# Patient Record
Sex: MALE | Race: WHITE | NOT HISPANIC OR LATINO | Employment: UNEMPLOYED | URBAN - METROPOLITAN AREA
[De-identification: names, ages, dates, MRNs, and addresses within clinical notes are randomized per-mention and may not be internally consistent; named-entity substitution may affect disease eponyms.]

---

## 2017-04-27 ENCOUNTER — NON-PROVIDER VISIT (OUTPATIENT)
Dept: URGENT CARE | Facility: CLINIC | Age: 47
End: 2017-04-27

## 2017-04-27 DIAGNOSIS — Z02.1 PRE-EMPLOYMENT DRUG SCREENING: ICD-10-CM

## 2017-04-27 LAB
AMP AMPHETAMINE: NORMAL
COC COCAINE: NORMAL
INT CON NEG: NORMAL
INT CON POS: NORMAL
MET METHAMPHETAMINES: NORMAL
OPI OPIATES: NORMAL
PCP PHENCYCLIDINE: NORMAL
POC DRUG COMMENT 753798-OCCUPATIONAL HEALTH: NORMAL
THC: NORMAL

## 2017-04-27 PROCEDURE — 80305 DRUG TEST PRSMV DIR OPT OBS: CPT | Performed by: PHYSICIAN ASSISTANT

## 2017-05-30 ENCOUNTER — OCCUPATIONAL MEDICINE (OUTPATIENT)
Dept: URGENT CARE | Facility: CLINIC | Age: 47
End: 2017-05-30
Payer: COMMERCIAL

## 2017-05-30 ENCOUNTER — APPOINTMENT (OUTPATIENT)
Dept: RADIOLOGY | Facility: IMAGING CENTER | Age: 47
End: 2017-05-30
Attending: PHYSICIAN ASSISTANT
Payer: COMMERCIAL

## 2017-05-30 VITALS
TEMPERATURE: 97.9 F | SYSTOLIC BLOOD PRESSURE: 184 MMHG | BODY MASS INDEX: 33.32 KG/M2 | HEIGHT: 71 IN | RESPIRATION RATE: 16 BRPM | DIASTOLIC BLOOD PRESSURE: 106 MMHG | HEART RATE: 84 BPM | WEIGHT: 238 LBS | OXYGEN SATURATION: 96 %

## 2017-05-30 DIAGNOSIS — S96.911A ANKLE STRAIN, RIGHT, INITIAL ENCOUNTER: ICD-10-CM

## 2017-05-30 DIAGNOSIS — M25.571 ACUTE RIGHT ANKLE PAIN: ICD-10-CM

## 2017-05-30 PROCEDURE — 73610 X-RAY EXAM OF ANKLE: CPT | Mod: TC,RT | Performed by: PHYSICIAN ASSISTANT

## 2017-05-30 PROCEDURE — 99203 OFFICE O/P NEW LOW 30 MIN: CPT | Performed by: PHYSICIAN ASSISTANT

## 2017-05-30 ASSESSMENT — ENCOUNTER SYMPTOMS
SHORTNESS OF BREATH: 0
FEVER: 0
COUGH: 0
CHILLS: 0
ROS SKIN COMMENTS: NO BRUISING OR REDNESS
PALPITATIONS: 0
SENSORY CHANGE: 0
TINGLING: 0
FOCAL WEAKNESS: 0

## 2017-05-30 ASSESSMENT — PAIN SCALES - GENERAL: PAINLEVEL: 10=SEVERE PAIN

## 2017-05-30 NOTE — Clinical Note
"EMPLOYEE’S CLAIM FOR COMPENSATION/ REPORT OF INITIAL TREATMENT  FORM C-4    EMPLOYEE’S CLAIM - PROVIDE ALL INFORMATION REQUESTED   First Name  Leonel Last Name  Kwesi Birthdate                    1970                Sex  male Claim Number   Home Address  [address] Age  46 y.o. 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 (if applicable)  Getting off move xx rolled twisted ankle.    How did this injury or occupational disease occur? (Be specific an answer in detail. Use additional sheet if necessary)  Picking order at [employer] on move[employer] twisted ankle.   If you believe that you have an occupational disease, when did you first have knowledge of the disability and it relationship to your employment?  n/a Witnesses to the Accident  n/a      Nature of Injury or Occupational Disease  Workers' Compensation  Part(s) of Body Injured or Affected  Ankle (R), ,     I certify that the above is true and correct to the best of my knowledge and that I have provided this information in order to obtain the benefits of [place] " [place]).  I hereby authorize any physician, chiropractor, surgeon, practitioner, or other person, any hospital, including [facility] or [facility] hospital, any medical service organization, any insurance company, or other institution or organization to release to each other, any medical or other information, including benefits paid or payable, pertinent to this injury or disease, except information relative to diagnosis, treatment and/or counseling for AIDS, psychological conditions, alcohol or controlled substances, for which I must give specific authorization.  A Photostat of this authorization shall be as valid as the original.     Date 5/30/17   [facility]   Employee’s Signature   THIS REPORT MUST BE COMPLETED AND MAILED WITHIN 3 WORKING DAYS OF TREATMENT   Place  [facility]  Name of Facility  [facility]   Date  5/30/2017 Diagnosis  (S96.911A) Ankle strain, right, initial encounter Is there evidence the injured employee was under the influence of alcohol and/or another controlled substance at the time of accident?   Hour  9:44 AM Description of Injury or Disease  The encounter diagnosis was Ankle strain, right, initial encounter. No   Treatment  RICE, OTC Ibuprofen 600 mg po TID prn  Have you advised the patient to remain off work five days or more? No   X-Ray Findings  Negative   If Yes   From Date  To Date      From information given by the employee, together with medical evidence, can you directly connect this injury or occupational disease as job incurred?  Yes If No Full Duty  Yes Modified Duty      Is additional medical care by a physician indicated?  Yes If Modified Duty, Specify any Limitations / Restrictions  Patient does not work until 5/2/17. RICE for the next several days. Follow-up on 5/2/17. If no improvement consider restrictions at that time.   Do you know of any  "previous injury or disease contributing to this condition or occupational disease?                            Yes  Comments:Patient reports previous fracture of right ankle   Date  5/30/2017 Print Doctor’s Name Radha Cruz PA-C I certify the employer’s copy of  this form was mailed on:   Address  [address] Insurer’s Use Only     [facility] Zip  [address]    Provider’s Tax ID Number  [id number]  Telephone  Dept: [phone]        e-RADHA Walker PA-C   e-Signature: Dr. Himanshu Pantoja, Medical Director Degree  ELYSIA        ORIGINAL-TREATING PHYSICIAN OR CHIROPRACTOR    PAGE 2-INSURER/TPA    PAGE 3-EMPLOYER    PAGE 4-EMPLOYEE             Form C-4 (rev10/07)              BRIEF DESCRIPTION OF RIGHTS AND BENEFITS  (Pursuant to [place]RS 616C.050)    Notice of Injury or Occupational Disease (Incident Report Form C-1): If an injury or occupational disease (OD) arises out of and in the  course of employment, you must provide written notice to your employer as soon as practicable, but no later than 7 days after the accident or  OD. Your employer shall maintain a sufficient supply of the required forms.    Claim for Compensation (Form C-4): If medical treatment is sought, the form C-4 is available at the place of initial treatment. A completed  \"Claim for Compensation\" (Form C-4) must be filed within 90 days after an accident or OD. The treating physician or chiropractor must,  within 3 working days after treatment, complete and mail to the employer, the employer's insurer and third-party , the Claim for  Compensation.    Medical Treatment: If you require medical treatment for your on-the-job injury or OD, you may be required to select a physician or  chiropractor from a list provided by your workers’ compensation insurer, if it has contracted with an Organization for Managed Care (MCO) or  Preferred Provider Organization (PPO) or providers of health care. If your employer has " not entered into a contract with an MCO or PPO, you  may select a physician or chiropractor from the Panel of Physicians and Chiropractors. Any medical costs related to your industrial injury or  OD will be paid by your insurer.    Temporary Total Disability (TTD): If your doctor has certified that you are unable to work for a period of at least 5 consecutive days, or 5  cumulative days in a 20-day period, or places restrictions on you that your employer does not accommodate, you may be entitled to TTD  compensation.    Temporary Partial Disability (TPD): If the wage you receive upon reemployment is less than the compensation for TTD to which you are  entitled, the insurer may be required to pay you TPD compensation to make up the difference. TPD can only be paid for a maximum of 24  months.    Permanent Partial Disability (PPD): When your medical condition is stable and there is an indication of a PPD as a result of your injury or  OD, within 30 days, your insurer must arrange for an evaluation by a rating physician or chiropractor to determine the degree of your PPD. The  amount of your PPD award depends on the date of injury, the results of the PPD evaluation and your age and wage.    Permanent Total Disability (PTD): If you are medically certified by a treating physician or chiropractor as permanently and totally disabled  and have been granted a PTD status by your insurer, you are entitled to receive monthly benefits not to exceed 66 2/3% of your average  monthly wage. The amount of your PTD payments is subject to reduction if you previously received a PPD award.    Vocational Rehabilitation Services: You may be eligible for vocational rehabilitation services if you are unable to return to the job due to a  permanent physical impairment or permanent restrictions as a result of your injury or occupational disease.    Transportation and Per [place] Reimbursement: You may be eligible for travel expenses and per shasta  associated with medical treatment.    Reopening: You may be able to reopen your claim if your condition worsens after claim closure.    Appeal Process: If you disagree with a written determination issued by the insurer or the insurer does not respond to your request, you may  appeal to the Department of Administration, , by following the instructions contained in your determination letter. You must  appeal the determination within 70 days from the date of the determination letter at [address], [address], [place], [place]  [address], or [address], [address], [place], [place] [address]. If you disagree with the  decision, you may appeal to the  Department of Administration, . You must file your appeal within 30 days from the date of the  decision  letter at [address], [address], [place], [place] [address], or [address], [place], [place] [address]. If you  disagree with a decision of an , you may file a petition for judicial review with the District Court. You must do so within 30  days of the Appeal Officer’s decision. You may be represented by an  at your own expense or you may contact the [facility] for possible  representation.    [place]  for Injured Workers (NAIW): If you disagree with a  decision, you may request that NAIW represent you  without charge at an  Hearing. For information regarding denial of benefits, you may contact the [facility] at: [address], [address], [place] [address], [phone], or [address], [address], [place] [address], [phone]    To File a Complaint with the Division: If you wish to file a complaint with the  of the Division of Industrial Relations (DIR),  please contact the Workers’ Compensation Section, [address], [address], [place], [place] [address], telephone  [phone], or  [address], [address], [place], [place] [address], telephone [phone].    For assistance with Workers’ Compensation Issues: you may contact the Office of the Governor Consumer Health Assistance, [address] [place] [address], [address], [place], [place] [address], Toll Free 1-393.736.2053, Web site: http://[employer] [place].[facility].nv., E-mail  Binta@[facility].nv.                                                                                                                                                                                                                                   __________________________________________________________________                                                                   ________5/30/17_________                Employee Name / Signature                                                                                                                                                       Date                                                                                                                                                                                                     D-2 (rev. 10/07)

## 2017-05-30 NOTE — MR AVS SNAPSHOT
"        Leonel Durantjustina   2017 9:45 AM   Occupational Medicine   MRN: [id number]    Department:  [facility]   Dept Phone:  [phone]    Description:  Male : 1970   Provider:  Milagro Cruz PA-C           Reason for Visit     Ankle Pain rolled right ankle        Allergies as of 2017     Allergen Noted Reactions    Penicillins 2010   Anaphylaxis    Pt tolerates Keflex    Hydrocodone 2016   Itching    Made him itch all over    Zinc 10/08/2015   Itching      You were diagnosed with     Ankle strain, right, initial encounter   [[id number]]         Vital Signs     Blood Pressure Pulse Temperature Respirations Height Weight    184/106 mmHg 84 36.6 °C (97.9 °F) 16 1.803 m (5' 11\") 107.956 kg (238 lb)    Body Mass Index Oxygen Saturation Smoking Status             33.21 kg/m2 96% Former Smoker         Basic Information     Date Of Birth Sex Race Ethnicity Preferred Language    1970 Male White Non- English      Your appointments     2017  3:00 PM   Workers Compensation with [employer]   [facility] [employer] (--)    [address]  [facility] [facility] [address]   [phone]              Problem List              ICD-10-CM Priority Class Noted - Resolved    Leg ulcer, left (CMS-HCC) L97.929   2012 - Present    Wound infection T14.8, L08.9   2012 - Present    Cellulitis of left leg L03.116   2012 - Present    Hypertension I10   2012 - Present    Hyperlipemia E78.5   2012 - Present    DIABETES MELLITUS    2012 - Present    Severe sepsis (CMS-HCC) A41.9, R65.20   10/8/2015 - Present    Cellulitis L03.90   10/8/2015 - Present    Cellulitis L03.90   10/8/2015 - Present    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   10/8/2015 - Present    Acute kidney injury (CMS-HCC) N17.9   10/8/2015 - Present    Leukocytosis D72.829   10/8/2015 - Present    Ulcer of lower extremity due to diabetes (CMS-HCC) " E11.622, L97.909   10/8/2015 - Present    Ulcer of abdomen wall, limited to breakdown of skin (CMS-[facility]) L98.491   10/8/2015 - Present    Hyponatremia E87.1   10/8/2015 - Present    Elevated alkaline phosphatase measurement R74.8   10/8/2015 - Present    Abnormal urinalysis R82.90   10/8/2015 - Present    Inguinal adenopathy R59.0   10/14/2015 - Present      Health Maintenance        Date Due Completion Dates    IMM HEP B VACCINE (1 of 3 - Primary Series) 1970 ---    DIABETES MONOFILAMENT / LE EXAM 2/15/1970 ---    RETINAL SCREENING 8/15/1987 ---    FASTING LIPID PROFILE 8/15/1987 ---    URINE ACR / MICROALBUMIN 8/15/1987 ---    IMM DTaP/Tdap/Td Vaccine (1 - Tdap) 8/15/1988 ---    IMM PNEUMOCOCCAL 19-64 (ADULT) MEDIUM RISK SERIES (1 of 1 - PPSV23) 8/15/1988 ---    A1C SCREENING 4/8/2016 10/8/2015    SERUM CREATININE 10/16/2016 10/16/2015, 10/15/2015, 10/14/2015, 10/13/2015, 10/12/2015, 10/11/2015, 10/10/2015, 10/9/2015, 10/8/2015, 10/8/2015, 11/20/2010            Current Immunizations     No immunizations on file.      Below and/or attached are the medications your provider expects you to take. Review all of your home medications and newly ordered medications with your provider and/or pharmacist. Follow medication instructions as directed by your provider and/or pharmacist. Please keep your medication list with you and share with your provider. Update the information when medications are discontinued, doses are changed, or new medications (including over-the-counter products) are added; and carry medication information at all times in the event of emergency situations     Allergies:  PENICILLINS - Anaphylaxis     HYDROCODONE - Itching     ZINC - Itching               Medications  Valid as of: May 30, 2017 - 11:13 AM    Generic Name Brand Name Tablet Size Instructions for use    Elastic Bandages & Supports (Misc) T.E.D. BELOW KNEE/L-REGULAR  1 Package by Does not apply route every day.        Lisinopril (Tab)  PRINIVIL 20 MG Take 1 Tab by mouth every day.        MetFORMIN HCl (Tab) GLUCOPHAGE 1000 MG Take 1 Tab by mouth 2 times a day, with meals.        TraMADol HCl (Tab) ULTRAM 50 MG Take 1-2 Tabs by mouth every 6 hours as needed.        Wound Dressings (Pads) VASELINE PETROLATUM GAUZE  1 Each by Apply externally route 1 time daily as needed.        .                 Medicines prescribed today were sent to:     [facility] - [facility] [address] [facility] [address]    Phone: [phone] Fax: [phone]    Open 24 Hours?: No    [id number] [place], [place] - [address]    [address] [address]    Phone: [phone] Fax: [phone]    Open 24 Hours?: No      Medication refill instructions:       If your prescription bottle indicates you have medication refills left, it is not necessary to call your provider’s office. Please contact your pharmacy and they will refill your medication.    If your prescription bottle indicates you do not have any refills left, you may request refills at any time through one of the following ways: The online [employer] system (except Urgent Care), by calling your provider’s office, or by asking your pharmacy to contact your provider’s office with a refill request. Medication refills are processed only during regular business hours and may not be available until the next business day. Your provider may request additional information or to have a follow-up visit with you prior to refilling your medication.   *Please Note: Medication refills are assigned a new Rx number when refilled electronically. Your pharmacy may indicate that no refills were authorized even though a new prescription for the same medication is available at the pharmacy. Please request the medicine by name with the pharmacy before contacting your provider for a refill.        Your To Do List     Future  Labs/Procedures Complete By Expires    DX-ANKLE 3+ VIEWS RIGHT  As directed 5/30/2018         TalkBin Access Code: [email or web address]  Expires: 6/29/2017 11:13 AM    Your email address is not on file at [facility].  Email Addresses are required for you to sign up for TalkBin, please contact [phone] to verify your personal information and to provide your email address prior to attempting to register for TalkBin.    Leonel Orosco  [address] [place] [address]    TalkBin  A secure, online tool to manage your health information     [facility]’s TalkBin® is a secure, online tool that connects you to your personalized health information from the privacy of your home -- day or night - making it very easy for you to manage your healthcare. Once the activation process is completed, you can even access your medical information using the TalkBin siria, which is available for free in the Apple Siria store or Google Play store.     To learn more about TalkBin, visit [email or web address]    There are two levels of access available (as shown below):   My Chart Features  [facility] [place] Primary Care Doctor [facility] [place]  Specialists [facility] [place]  Urgent  Care Non-[facility] [place] Primary Care Doctor   Email your healthcare team securely and privately 24/7 X X X    Manage appointments: schedule your next appointment; view details of past/upcoming appointments X      Request prescription refills. X      View recent personal medical records, including lab and immunizations X X X X   View health record, including health history, allergies, medications X X X X   Read reports about your outpatient visits, procedures, consult and ER notes X X X X   See your discharge summary, which is a recap of your hospital and/or ER visit that includes your diagnosis, lab results, and care plan X X  X     How to register for TalkBin:  Once your e-mail address has been verified, follow the following steps to sign up for TalkBin.     1. Go to   [email or web address]  2. Click on the Sign Up Now box, which takes you to the New Member Sign Up page. You will need to provide the following information:  a. Enter your Kiwup Access Code exactly as it appears at the top of this page. (You will not need to use this code after you’ve completed the sign-up process. If you do not sign up before the expiration date, you must request a new code.)   b. Enter your date of birth.   c. Enter your home email address.   d. Click Submit, and follow the next screen’s instructions.  3. Create a Kiwup ID. This will be your Kiwup login ID and cannot be changed, so think of one that is secure and easy to remember.  4. Create a Digilabt password. You can change your password at any time.  5. Enter your Password Reset Question and Answer. This can be used at a later time if you forget your password.   6. Enter your e-mail address. This allows you to receive e-mail notifications when new information is available in Kiwup.  7. Click Sign Up. You can now view your health information.    For assistance activating your Kiwup account, call [phone]

## 2017-05-30 NOTE — PROGRESS NOTES
"Subjective:      Leonel Orosco is a 46 y.o. male who presents with Ankle Pain      DOI: 5/28/17. The patient was getting off a machine while at work when he suffered an inversion injury of his right ankle. He complains of fluctuating pain of his right lateral ankle and anterior ankle with walking and movement. He states he felt some numbness in his distal toes yesterday but he has no numbness or tingling at this time. He reports fracturing his ankle over 30 years ago. He is able to bear weight but complains of pain with bearing weight. He has been icing his ankle and elevating his foot with some relief. He has not been taking anything for his pain.     HPI    Past Medical History   Diagnosis Date   • Diabetes    • Hypertension    • Hypercholesteremia    • Cellulitis      both lower legs       Past Surgical History   Procedure Laterality Date   • Other orthopedic surgery       both knees x 3   • Other abdominal surgery       exploratory; appendectomy       Family History   Problem Relation Age of Onset   • Heart Disease Father    • Hypertension Paternal Aunt    • Diabetes Paternal Uncle      X  Allergies   Allergen Reactions   • Penicillins Anaphylaxis     Pt tolerates Keflex   • Hydrocodone Itching     Made him itch all over   • Zinc Itching       Medications, Allergies, and current problem list reviewed today in Epic    Review of Systems   Constitutional: Negative for fever and chills.   Respiratory: Negative for cough and shortness of breath.    Cardiovascular: Negative for chest pain, palpitations and leg swelling.   Musculoskeletal: Positive for joint pain (right ankle pain ).   Skin:        No bruising or redness   Neurological: Negative for tingling, sensory change and focal weakness.     All other systems reviewed and are negative.          Objective:     /106 mmHg  Pulse 84  Temp(Src) 36.6 °C (97.9 °F)  Resp 16  Ht 1.803 m (5' 11\")  Wt 107.956 kg (238 lb)  BMI 33.21 kg/m2  SpO2 96% "     Physical Exam   Constitutional: He is oriented to person, place, and time. He appears well-developed and well-nourished. No distress.   HENT:   Head: Normocephalic and atraumatic.   Eyes: Conjunctivae are normal.   Pulmonary/Chest: Effort normal. No respiratory distress.   Neurological: He is alert and oriented to person, place, and time. No cranial nerve deficit. Gait (antalgic gait ) abnormal.   Psychiatric: He has a normal mood and affect. His behavior is normal. Judgment and thought content normal.       Vitals reviewed.   General: A&O x 3. NAD.  Right ankle: point TTP over lateral malleolus. Slight limitation with inversion due to pain. No ecchymosis or swelling noted. Distal n/v intact. Anterior drawer test negative. Mild point TTP over anterior ankle joint line.       5/30/2017 10:30 AM    HISTORY/REASON FOR EXAM:  Pain/Deformity Following Trauma.      TECHNIQUE/EXAM DESCRIPTION AND NUMBER OF VIEWS:  3 views of the RIGHT ankle.    COMPARISON: None.    FINDINGS:  No acute fracture identified.  Mild lateral soft tissue swelling.  Ankle mortise appears intact.  Moderate sized posterior calcaneal spurs.    Periosteal reaction right fibular and tibial diaphyses incompletely imaged.         Impression        Mild soft tissue swelling with no acute fracture identified.  Periosteal reaction right fibula and tibia incompletely imaged. Uncertain etiology..       Assessment/Plan:     1. Ankle strain, right, initial encounter    The patient suffered a mild Sprain of his right ankle. X-rays negative for fracture.   Discussed findings with patient regarding periosteal reaction- explained that this could be from a previous injury  But that he needs further work-up through his PCP as this is not a work-related findings. Discussed possible caused to this and strongly suggested   Work-up ASAP. Discussed case with Dr. Ayala who agreed with above.  Encouraged RICE and OTC NSAIDS. Follow-up in 3 days before returning back to  work. If no improvement,   Will consider restrictions at that time before the patient returns to work.  Discussed case with Gene Purdy at [facility].       Differential diagnoses, Supportive care, and indications for immediate follow-up discussed with patient.   Instructed to return to clinic or nearest emergency department for any change in condition, further concerns, or worsening of symptoms.    The patient demonstrated a good understanding and agreed with the treatment plan.    Milagro Cruz PA-C
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
## 2017-07-31 ENCOUNTER — NON-PROVIDER VISIT (OUTPATIENT)
Dept: URGENT CARE | Facility: PHYSICIAN GROUP | Age: 47
End: 2017-07-31

## 2017-07-31 DIAGNOSIS — Z02.1 PRE-EMPLOYMENT DRUG SCREENING: ICD-10-CM

## 2017-07-31 LAB
AMP AMPHETAMINE: NORMAL
COC COCAINE: NORMAL
INT CON NEG: NORMAL
INT CON POS: NORMAL
MET METHAMPHETAMINES: NORMAL
OPI OPIATES: NORMAL
PCP PHENCYCLIDINE: NORMAL
POC DRUG COMMENT 753798-OCCUPATIONAL HEALTH: NEGATIVE
THC: NORMAL

## 2017-07-31 PROCEDURE — 80305 DRUG TEST PRSMV DIR OPT OBS: CPT | Performed by: PHYSICIAN ASSISTANT

## 2017-09-11 ENCOUNTER — NON-PROVIDER VISIT (OUTPATIENT)
Dept: URGENT CARE | Facility: PHYSICIAN GROUP | Age: 47
End: 2017-09-11

## 2017-09-11 DIAGNOSIS — Z02.1 PRE-EMPLOYMENT DRUG SCREENING: ICD-10-CM

## 2017-09-11 PROCEDURE — 80305 DRUG TEST PRSMV DIR OPT OBS: CPT | Performed by: PHYSICIAN ASSISTANT

## 2017-09-20 ENCOUNTER — OCCUPATIONAL MEDICINE (OUTPATIENT)
Dept: URGENT CARE | Facility: PHYSICIAN GROUP | Age: 47
End: 2017-09-20
Payer: COMMERCIAL

## 2017-09-20 VITALS
SYSTOLIC BLOOD PRESSURE: 146 MMHG | WEIGHT: 249.8 LBS | DIASTOLIC BLOOD PRESSURE: 90 MMHG | RESPIRATION RATE: 20 BRPM | HEART RATE: 98 BPM | BODY MASS INDEX: 34.97 KG/M2 | HEIGHT: 71 IN | TEMPERATURE: 98.1 F | OXYGEN SATURATION: 100 %
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
## 2017-09-20 DIAGNOSIS — R05.9 COUGH: ICD-10-CM

## 2017-09-20 DIAGNOSIS — R06.02 SHORTNESS OF BREATH: ICD-10-CM

## 2017-09-20 DIAGNOSIS — Z02.1 PRE-EMPLOYMENT DRUG SCREENING: ICD-10-CM

## 2017-09-20 LAB
AMP AMPHETAMINE: NORMAL
BAR BARBITURATES: NORMAL
BREATH ALCOHOL COMMENT: NORMAL
BZO BENZODIAZEPINES: NORMAL
COC COCAINE: NORMAL
INT CON NEG: NEGATIVE
INT CON POS: POSITIVE
MDMA ECSTASY: NORMAL
MET METHAMPHETAMINES: NORMAL
MTD METHADONE: NORMAL
OPI OPIATES: NORMAL
OXY OXYCODONE: NORMAL
PCP PHENCYCLIDINE: NORMAL
POC BREATHALIZER: 0 PERCENT (ref 0–0.01)
POC URINE DRUG SCREEN OCDRS: NORMAL
THC: NORMAL

## 2017-09-20 PROCEDURE — 80305 DRUG TEST PRSMV DIR OPT OBS: CPT | Performed by: FAMILY MEDICINE

## 2017-09-20 PROCEDURE — 82075 ASSAY OF BREATH ETHANOL: CPT | Performed by: FAMILY MEDICINE

## 2017-09-20 RX ORDER — ALBUTEROL SULFATE 90 UG/1
AEROSOL, METERED RESPIRATORY (INHALATION)
Qty: 1 INHALER | Refills: 2 | Status: SHIPPED | OUTPATIENT
Start: 2017-09-20 | End: 2019-03-15
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
## 2017-09-22 ENCOUNTER — OCCUPATIONAL MEDICINE (OUTPATIENT)
Dept: URGENT CARE | Facility: PHYSICIAN GROUP | Age: 47
End: 2017-09-22
Payer: COMMERCIAL

## 2017-09-22 VITALS
WEIGHT: 250 LBS | SYSTOLIC BLOOD PRESSURE: 144 MMHG | TEMPERATURE: 97.8 F | BODY MASS INDEX: 35 KG/M2 | DIASTOLIC BLOOD PRESSURE: 98 MMHG | OXYGEN SATURATION: 96 % | HEIGHT: 71 IN | RESPIRATION RATE: 16 BRPM | HEART RATE: 104 BPM
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
## 2017-09-22 DIAGNOSIS — R06.02 SOB (SHORTNESS OF BREATH): ICD-10-CM

## 2017-09-22 PROCEDURE — 99213 OFFICE O/P EST LOW 20 MIN: CPT | Mod: 29 | Performed by: PHYSICIAN ASSISTANT
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
## 2017-11-06 ENCOUNTER — OFFICE VISIT (OUTPATIENT)
Dept: URGENT CARE | Facility: PHYSICIAN GROUP | Age: 47
End: 2017-11-06
Payer: MEDICAID

## 2017-11-06 ENCOUNTER — TELEPHONE (OUTPATIENT)
Dept: URGENT CARE | Facility: PHYSICIAN GROUP | Age: 47
End: 2017-11-06

## 2017-11-06 VITALS
OXYGEN SATURATION: 97 % | RESPIRATION RATE: 18 BRPM | HEART RATE: 98 BPM | WEIGHT: 249 LBS | HEIGHT: 71 IN | DIASTOLIC BLOOD PRESSURE: 104 MMHG | SYSTOLIC BLOOD PRESSURE: 150 MMHG | TEMPERATURE: 97.5 F | BODY MASS INDEX: 34.86 KG/M2

## 2017-11-06 DIAGNOSIS — E66.9 OBESITY (BMI 30-39.9): ICD-10-CM

## 2017-11-06 DIAGNOSIS — E11.622 TYPE 2 DIABETES MELLITUS WITH OTHER SKIN ULCER, WITHOUT LONG-TERM CURRENT USE OF INSULIN (HCC): ICD-10-CM

## 2017-11-06 DIAGNOSIS — L03.115 CELLULITIS OF RIGHT LOWER EXTREMITY: ICD-10-CM

## 2017-11-06 DIAGNOSIS — L03.115 CELLULITIS OF RIGHT LOWER EXTREMITY: Primary | ICD-10-CM

## 2017-11-06 PROCEDURE — 99214 OFFICE O/P EST MOD 30 MIN: CPT | Performed by: PHYSICIAN ASSISTANT

## 2017-11-06 RX ORDER — CLINDAMYCIN HYDROCHLORIDE 300 MG/1
300 CAPSULE ORAL 3 TIMES DAILY
Qty: 30 CAP | Refills: 0 | Status: SHIPPED | OUTPATIENT
Start: 2017-11-06 | End: 2017-11-16

## 2017-11-06 RX ORDER — CLINDAMYCIN HYDROCHLORIDE 300 MG/1
300 CAPSULE ORAL 3 TIMES DAILY
Qty: 30 CAP | Refills: 0 | Status: SHIPPED | OUTPATIENT
Start: 2017-11-06 | End: 2017-11-06 | Stop reason: SDUPTHER

## 2017-11-06 NOTE — TELEPHONE ENCOUNTER
Patient called and wanted to know where his prescriptions were sent. I informed him that they were sent to the pharmacy we had on file ([facility]).     Patient requesting that the scripts from today be transferred to [facility]

## 2017-11-06 NOTE — PROGRESS NOTES
Chief Complaint   Patient presents with   • Leg Swelling     possible infection       HISTORY OF PRESENT ILLNESS: Patient is a 47 y.o. male who presents today becauseHe has redness and swelling and ulceration of his right lower extremity. He has a long history of Diabetes, he is in the process of getting a new primary care provider. He has an appointment in about 10 days. He denies any fevers, chills, nausea, vomiting or diarrhea. Had a lot of fatigue and myalgias yesterday. He has been trying to dress it with some over-the-counter dressings and Neosporin but seems to be getting worse. Again, he is diabetic, is out of his metformin 500 mg tablets.    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) 10/08/2015   • Acute kidney injury (CMS-HCC) 10/08/2015   • Leukocytosis 10/08/2015   • Ulcer of lower extremity due to diabetes (CMS-HCC) 10/08/2015   • Ulcer of abdomen wall, limited to breakdown of skin (CMS-HCC) 10/08/2015   • Hyponatremia 10/08/2015   • Elevated alkaline phosphatase measurement 10/08/2015   • Abnormal urinalysis 10/08/2015   • Leg ulcer, left (CMS-HCC) 12/14/2012   • Wound infection 12/14/2012   • Cellulitis of left leg 12/14/2012   • Hypertension 12/14/2012   • Hyperlipemia 12/14/2012   • DIABETES MELLITUS 12/14/2012       Allergies:Penicillins; Hydrocodone; and Zinc    Current Outpatient Prescriptions Ordered in [facility]   Medication Sig Dispense Refill   • metformin (GLUCOPHAGE) 500 MG Tab Take 1 Tab by mouth 2 times a day, with meals. 60 Tab 0   • clindamycin (CLEOCIN) 300 MG Cap Take 1 Cap by mouth 3 times a day for 10 days. 30 Cap 0   • albuterol 108 (90 Base) MCG/ACT Aero Soln inhalation aerosol 2 PUFFS EVERY 4 HOURS ONLY IF NEEDED FOR COUGH, WHEEZING, OR SHORTNESS OF  "BREATH. WORK COMP. 1 Inhaler 2   • lisinopril (PRINIVIL) 20 MG Tab Take 1 Tab by mouth every day. 30 Tab 11     No current [facility]-ordered facility-administered medications on file.        Past Medical History:   Diagnosis Date   • Cellulitis     both lower legs   • Diabetes    • Hypercholesteremia    • Hypertension        Social History   Substance Use Topics   • Smoking status: Former Smoker     Years: 5.00     Types: Cigarettes     Quit date: 4/8/2015   • Smokeless tobacco: Never Used      Comment: quit 1 year ago   • Alcohol use No       Family Status   Relation Status   • Father    • Paternal Aunt    • Paternal Uncle      Family History   Problem Relation Age of Onset   • Heart Disease Father    • Hypertension Paternal Aunt    • Diabetes Paternal Uncle        ROS:  Review of Systems   Constitutional: Negative for fever, chills, weight loss and malaise/fatigue.   HENT: Negative for ear pain, nosebleeds, congestion, sore throat and neck pain.    Eyes: Negative for blurred vision.   Respiratory: Negative for cough, sputum production, shortness of breath and wheezing.    Cardiovascular: Negative for chest pain, palpitations, orthopnea andPositive chronic and worsening leg swelling.   Gastrointestinal: Negative for heartburn, nausea, vomiting and abdominal pain.   Genitourinary: Negative for dysuria, urgency and frequency.     Exam:  Blood pressure 150/104, pulse 98, temperature 36.4 °C (97.5 °F), resp. rate 18, height 1.803 m (5' 11\"), weight 112.9 kg (249 lb), SpO2 97 %.  General:  Well nourished, well developed male in NAD  Head:Normocephalic, atraumatic  Eyes: PERRLA, EOM within normal limits, no conjunctival injection, no scleral icterus, visual fields and acuity grossly intact.  Extremities: Right lower extremity has multiple ulcerations to the lateral aspect of his calf, surrounding erythema in 1-2+ edema from his ankle to the level of the mid shin.    Please note that this dictation was created using voice " recognition software. I have made every reasonable attempt to correct obvious errors, but I expect that there are errors of grammar and possibly content that I did not discover before finalizing the note.    Assessment/Plan:  1. Cellulitis of right lower extremity  clindamycin (CLEOCIN) 300 MG Cap   2. Type 2 diabetes mellitus with other skin ulcer, without long-term current use of insulin (CMS-HCC)  metformin (GLUCOPHAGE) 500 MG Tab   3. Obesity (BMI 30-39.9)  Patient identified as having weight management issue.  Appropriate orders and counseling given.       Followup with primary care in the next 7-10 days if not significantly improving, return to the urgent care or go to the emergency room sooner for any worsening of symptoms.

## 2017-12-11 ENCOUNTER — APPOINTMENT (OUTPATIENT)
Dept: WOUND CARE | Facility: MEDICAL CENTER | Age: 47
End: 2017-12-11
Attending: FAMILY MEDICINE
Payer: MEDICAID

## 2019-02-02 ENCOUNTER — OFFICE VISIT (OUTPATIENT)
Dept: URGENT CARE | Facility: PHYSICIAN GROUP | Age: 49
End: 2019-02-02
Payer: MEDICAID

## 2019-02-02 VITALS
BODY MASS INDEX: 35.42 KG/M2 | TEMPERATURE: 98 F | HEIGHT: 71 IN | DIASTOLIC BLOOD PRESSURE: 112 MMHG | SYSTOLIC BLOOD PRESSURE: 160 MMHG | OXYGEN SATURATION: 98 % | WEIGHT: 253 LBS | RESPIRATION RATE: 14 BRPM | HEART RATE: 100 BPM

## 2019-02-02 DIAGNOSIS — R05.9 COUGH: ICD-10-CM

## 2019-02-02 DIAGNOSIS — J22 ACUTE RESPIRATORY INFECTION: ICD-10-CM

## 2019-02-02 DIAGNOSIS — M54.50 ACUTE RIGHT-SIDED LOW BACK PAIN WITHOUT SCIATICA: ICD-10-CM

## 2019-02-02 PROCEDURE — 99214 OFFICE O/P EST MOD 30 MIN: CPT | Performed by: FAMILY MEDICINE

## 2019-02-02 RX ORDER — BENZONATATE 200 MG/1
CAPSULE ORAL
Qty: 30 CAP | Refills: 0 | Status: SHIPPED | OUTPATIENT
Start: 2019-02-02 | End: 2019-03-15

## 2019-02-02 RX ORDER — AZITHROMYCIN 250 MG/1
TABLET, FILM COATED ORAL
Qty: 6 TAB | Refills: 0 | Status: SHIPPED | OUTPATIENT
Start: 2019-02-02 | End: 2019-03-15

## 2019-02-02 RX ORDER — CYCLOBENZAPRINE HCL 10 MG
TABLET ORAL
Qty: 30 TAB | Refills: 0 | Status: SHIPPED | OUTPATIENT
Start: 2019-02-02 | End: 2019-03-15

## 2019-02-02 NOTE — PROGRESS NOTES
Chief Complaint:    Chief Complaint   Patient presents with   • Back Pain     Lower R side x5-6d   • URI       History of Present Illness:    These are new problems.    Symptoms x 2-3 days; has nasal symptoms with purulent mucus from nose, sore throat, chest congestion, and cough productive of purulent mucus. No fever. No meds being used for symptoms.    For 1 week, he has right lower back pain for no particular reason. Hurts more when he moves certain ways and when goes to get up from sitting for a while. No injury or trauma. No radiating pain down legs. No loss of bowel or bladder control. No urine symptoms. Has taken Flexeril in past with help.      Review of Systems:    Constitutional: Negative for fever, chills, and diaphoresis.   Eyes: Negative for change in vision, photophobia, pain, redness, and discharge.  ENT: See HPI.   Respiratory: See HPI.  Cardiovascular: Negative for chest pain, palpitations, orthopnea, claudication, leg swelling, and PND.   Gastrointestinal: Negative for abdominal pain, nausea, vomiting, diarrhea, constipation, blood in stool, and melena.   Genitourinary: Negative for dysuria, urinary urgency, urinary frequency, hematuria, and flank pain.   Musculoskeletal: See HPI.  Skin: Negative for rash and itching.   Neurological: Negative for dizziness, tingling, tremors, sensory change, speech change, focal weakness, seizures, loss of consciousness, and headaches.   Endo: Diabetic, used to be on Metformin, off now. Currently not on any Diabetic medication.  Heme: Does not bruise/bleed easily.   Psychiatric/Behavioral: Negative for depression, suicidal ideas, hallucinations, memory loss and substance abuse. The patient is not nervous/anxious and does not have insomnia.        Past Medical History:    Past Medical History:   Diagnosis Date   • Cellulitis     both lower legs   • Diabetes    • Hypercholesteremia    • Hypertension      Past Surgical History:    Past Surgical History:   Procedure  "Laterality Date   • OTHER ABDOMINAL SURGERY      exploratory; appendectomy   • OTHER ORTHOPEDIC SURGERY      both knees x 3     Social History:    Social History     Social History   • Marital status:      Spouse name: N/A   • Number of children: N/A   • Years of education: N/A     Occupational History   • Not on file.     Social History Main Topics   • Smoking status: Former Smoker     Years: 5.00     Types: Cigarettes     Quit date: 4/8/2015   • Smokeless tobacco: Never Used      Comment: quit 1 year ago   • Alcohol use No   • Drug use: No   • Sexual activity: Yes     Partners: Female      Comment:      Other Topics Concern   • Not on file     Social History Narrative    , moved from [facility] to [facility] but living in hotel in [facility] because [place] living conditions were unacceptable. Not employed, recently too ill to go to job interviews that he had set up.     Family History:    Family History   Problem Relation Age of Onset   • Heart Disease Father    • Hypertension Paternal Aunt    • Diabetes Paternal Uncle      Medications:    Current Outpatient Prescriptions on File Prior to Visit   Medication Sig Dispense Refill   • lisinopril (PRINIVIL) 20 MG Tab Take 1 Tab by mouth every day. 30 Tab 11   • albuterol 108 (90 Base) MCG/ACT Aero Soln inhalation aerosol 2 PUFFS EVERY 4 HOURS ONLY IF NEEDED FOR COUGH, WHEEZING, OR SHORTNESS OF BREATH. WORK COMP. 1 Inhaler 2     No current facility-administered medications on file prior to visit.      Allergies:    Allergies   Allergen Reactions   • Penicillins Anaphylaxis     Pt tolerates Keflex   • Hydrocodone Itching     Made him itch all over   • Zinc Itching       Vitals:    Vitals:    02/02/19 1018   BP: 160/112   Pulse: 100   Resp: 14   Temp: 36.7 °C (98 °F)   TempSrc: Temporal   SpO2: 98%   Weight: 114.8 kg (253 lb)   Height: 1.803 m (5' 11\")       Physical Exam:    Constitutional: Vital signs reviewed. Appears well-developed and well-nourished. Occl " congested coughing. No acute distress.   Eyes: Sclera white, conjunctivae clear.   ENT: External ears normal. External auditory canals normal without discharge. TMs translucent and non-bulging. Hearing normal. Nasal mucosa pink. Lips/teeth are normal. Oral mucosa pink and moist. Posterior pharynx: WNL.  Neck: Neck supple.   Cardiovascular: Regular rate and rhythm. No murmur.  Pulmonary/Chest: Respirations non-labored. Clear to auscultation bilaterally.  Lymph: Cervical nodes without tenderness or enlargement.  Musculoskeletal: Mildly tender to palpation right lower back. Certain lumbar motions reproduce discomfort in right lower back, but has mostly full lumbar range of motion. Right lower back muscles feel tighter than left on palpation. Normal gait. No muscular atrophy or weakness.  Neurological: Alert and oriented to person, place, and time. Muscle tone normal. Coordination normal.   Skin: No rashes or lesions. Warm, dry, normal turgor.  Psychiatric: Normal mood and affect. Behavior is normal. Judgment and thought content normal.       Assessment / Plan:    1. Acute respiratory infection  - benzonatate (TESSALON) 200 MG capsule; 1 CAP UP TO 3 TIMES A DAY ONLY IF NEEDED FOR COUGH.  Dispense: 30 Cap; Refill: 0  - azithromycin (ZITHROMAX) 250 MG Tab; 2 TABS ON DAY 1, 1 TAB ON DAYS 2-5. TAKE ANTIBIOTIC IF GETTING WORSE.  Dispense: 6 Tab; Refill: 0    2. Acute right-sided low back pain without sciatica  - cyclobenzaprine (FLEXERIL) 10 MG Tab; 1 TAB EVERY 8 HOURS ONLY IF NEEDED FOR PAIN, MUSCLE SPASM, AND/OR MUSCLE TIGHTNESS. MAY CAUSE DROWSINESS.  Dispense: 30 Tab; Refill: 0    3. Cough  - benzonatate (TESSALON) 200 MG capsule; 1 CAP UP TO 3 TIMES A DAY ONLY IF NEEDED FOR COUGH.  Dispense: 30 Cap; Refill: 0      Discussed with him DDX, management options, and risks, benefits, and alternatives to treatment plan agreed upon.    Agreeable to medications prescribed for symptoms prn.    He will not take antibiotic at this  time, but start if getting much worse or not improving at all after ~ 1 week.    Discussed expected course of duration, time for improvement, and to seek follow-up in Emergency Room, urgent care, or with PCP if getting worse at any time or not improving within expected time frame.

## 2019-03-08 ENCOUNTER — APPOINTMENT (OUTPATIENT)
Dept: RADIOLOGY | Facility: IMAGING CENTER | Age: 49
End: 2019-03-08
Attending: PHYSICIAN ASSISTANT
Payer: COMMERCIAL

## 2019-03-08 ENCOUNTER — OCCUPATIONAL MEDICINE (OUTPATIENT)
Dept: URGENT CARE | Facility: PHYSICIAN GROUP | Age: 49
End: 2019-03-08
Payer: COMMERCIAL

## 2019-03-08 VITALS
HEART RATE: 81 BPM | TEMPERATURE: 98.5 F | WEIGHT: 248 LBS | OXYGEN SATURATION: 96 % | BODY MASS INDEX: 34.59 KG/M2 | RESPIRATION RATE: 18 BRPM

## 2019-03-08 DIAGNOSIS — S40.011A CONTUSION OF RIGHT SHOULDER, INITIAL ENCOUNTER: ICD-10-CM

## 2019-03-08 DIAGNOSIS — Z02.1 PRE-EMPLOYMENT DRUG SCREENING: ICD-10-CM

## 2019-03-08 PROCEDURE — 80305 DRUG TEST PRSMV DIR OPT OBS: CPT | Mod: 29 | Performed by: PHYSICIAN ASSISTANT

## 2019-03-08 PROCEDURE — 73030 X-RAY EXAM OF SHOULDER: CPT | Mod: TC,FY,RT,29 | Performed by: PHYSICIAN ASSISTANT

## 2019-03-08 PROCEDURE — 82075 ASSAY OF BREATH ETHANOL: CPT | Mod: 29 | Performed by: PHYSICIAN ASSISTANT

## 2019-03-08 PROCEDURE — 99214 OFFICE O/P EST MOD 30 MIN: CPT | Mod: 29 | Performed by: PHYSICIAN ASSISTANT

## 2019-03-08 ASSESSMENT — PAIN SCALES - GENERAL: PAINLEVEL: 6=MODERATE PAIN

## 2019-03-08 NOTE — LETTER
[facility] [place] [place] [address]  Phone:  [phone] - Fax:  [phone]   [facility] Progress Report and Disability Certification  Date of Service: 3/8/2019   No Show:  No  Date / Time of Next Visit: 3/15/2019   Claim Information   Patient Name: Leonel Orosco  Claim Number:     Employer: [employer]  Date of Injury: 3/7/2019     Insurer / TPA: [email or web address]  ID / SSN:     Occupation: [employer]  Diagnosis: The encounter diagnosis was Contusion of right shoulder, initial encounter.    Medical Information   Related to Industrial Injury? Yes    Subjective Complaints:  DOI: 3/7/19, around 1555 hrs; 1st visit  Was sliding a crate on the floor, slipped, fell, landed on the right shoulder, deltoid/lateral humerus taking the brunt of the fall. Pain 7-8/10. Currently: 4/10 at rest, 9/10 with movement. Numbness/tingling in right arm/hand but that has resolved. ? Bruising. OTC meds tried: Biofreeze, helped a small amount. History of right shoulder issues/injury: none. Other places of employment: none. No other outside contributory factors.    Objective Findings: Extremities: Full painful range of motion of the right shoulder but does have full range of motion.  Tenderness noted over the AC joint and deltoid without associated soft tissue swelling, erythema, ecchymosis, or changes noted.   strength is strong and equal bilaterally.   Pre-Existing Condition(s):     Assessment:   Initial Visit    Status: Additional Care Required  Permanent Disability:No    Plan:      Diagnostics: X-ray  Comments:neg shoulder    Comments:  Assessment/Plan:  Discussed appropriate dosing of ibuprofen/acetaminophen as needed for pain.  Recommend adding ice, heat, and over-the-counter muscle rubs for additional pain relief.  Return to clinic in 1 week for reevaluation, sooner for any signi  ficant changes in symptoms.  Refer to D 39 for  restrictions.  1. Contusion of right shoulder, initial encounter  DX-SHOULDER 2+ RIGHT        Disability Information   Status: Released to Restricted Duty    From:  3/8/2019  Through: 3/15/2019 Restrictions are: Temporary   Physical Restrictions   Sitting:    Standing:    Stooping:    Bending:      Squatting:    Walking:    Climbing:    Pushing:      Pulling:    Other:    Reaching Above Shoulder (L):   Reaching Above Shoulder (R): 0 hrs/day     Reaching Below Shoulder (L):    Reaching Below Shoulder (R):      Not to exceed Weight Limits   Carrying(hrs):   Weight Limit(lb): < or = to 10 pounds Lifting(hrs):   Weight  Limit(lb): < or = to 10 pounds   Comments: No pushing, pulling, lifting, or carry more than 10 pounds; restrictions through 3/10.  No restrictions of 3/11 until follow-up on 3/15.    Repetitive Actions   Hands: i.e. Fine Manipulations from Grasping:     Feet: i.e. Operating Foot Controls:     Driving / Operate Machinery:     Physician Name: Henri Morgan P.A.-C. Physician Signature: HENRI Slaughter P.A.-C. e-Signature: Dr. Himanshu Pantoja, Medical Director   Clinic Name / Location: [address] [address] Clinic Phone Number: Dept: [phone]   Appointment Time: 10:00 Am Visit Start Time: 10:15 AM   Check-In Time:  10:03 Am Visit Discharge Time:  11:41 am    Original-Treating Physician or Chiropractor    Page 2-Insurer/TPA    Page 3-Employer    Page 4-Employee

## 2019-03-08 NOTE — PROGRESS NOTES
Chief Complaint   Patient presents with   • Arm Injury     R Shoulder pain patient fell at work        HISTORY OF PRESENT ILLNESS: Patient is a 48 y.o. male who presents today for the following:    DOI: 3/7/19, around 1555 hrs; 1st visit  Was sliding a crate on the floor, slipped, fell, landed on the right shoulder, deltoid/lateral humerus taking the brunt of the fall. Pain 7-8/10. Currently: 4/10 at rest, 9/10 with movement. Numbness/tingling in right arm/hand but that has resolved. ? Bruising. OTC meds tried: Biofreeze, helped a small amount. History of right shoulder issues/injury: none. Other places of employment: none. No other outside contributory factors.     Patient Active Problem List    Diagnosis Date Noted   • Obesity (BMI 30-39.9) 11/06/2017   • Inguinal adenopathy 10/14/2015   • Severe sepsis (HCC) 10/08/2015   • Cellulitis 10/08/2015   • Cellulitis 10/08/2015   • Type 2 diabetes mellitus with skin complication ([facility]) 10/08/2015   • Acute kidney injury ([facility]) 10/08/2015   • Leukocytosis 10/08/2015   • Ulcer of lower extremity due to diabetes ([facility]) 10/08/2015   • Ulcer of abdomen wall, limited to breakdown of skin ([facility]) 10/08/2015   • Hyponatremia 10/08/2015   • Elevated alkaline phosphatase measurement 10/08/2015   • Abnormal urinalysis 10/08/2015   • Leg ulcer, left ([facility]) 12/14/2012   • Wound infection 12/14/2012   • Cellulitis of left leg 12/14/2012   • Hypertension 12/14/2012   • Hyperlipemia 12/14/2012   • DIABETES MELLITUS 12/14/2012       Allergies:Penicillins; Hydrocodone; and Zinc    Current Outpatient Prescriptions Ordered in [facility]   Medication Sig Dispense Refill   • lisinopril (PRINIVIL) 20 MG Tab Take 1 Tab by mouth every day. 30 Tab 11   • cyclobenzaprine (FLEXERIL) 10 MG Tab 1 TAB EVERY 8 HOURS ONLY IF NEEDED FOR PAIN, MUSCLE SPASM, AND/OR MUSCLE TIGHTNESS. MAY CAUSE DROWSINESS. (Patient not taking: Reported on 3/8/2019) 30 Tab 0   • benzonatate (TESSALON) 200 MG capsule 1 CAP UP TO 3  TIMES A DAY ONLY IF NEEDED FOR COUGH. (Patient not taking: Reported on 3/8/2019) 30 Cap 0   • azithromycin (ZITHROMAX) 250 MG Tab 2 TABS ON DAY 1, 1 TAB ON DAYS 2-5. TAKE ANTIBIOTIC IF GETTING WORSE. (Patient not taking: Reported on 3/8/2019) 6 Tab 0   • albuterol 108 (90 Base) MCG/ACT Aero Soln inhalation aerosol 2 PUFFS EVERY 4 HOURS ONLY IF NEEDED FOR COUGH, WHEEZING, OR SHORTNESS OF BREATH. WORK COMP. (Patient not taking: Reported on 3/8/2019) 1 Inhaler 2     No current Epic-ordered facility-administered medications on file.        Past Medical History:   Diagnosis Date   • Cellulitis     both lower legs   • Diabetes    • Hypercholesteremia    • Hypertension        Social History   Substance Use Topics   • Smoking status: Former Smoker     Years: 5.00     Types: Cigarettes     Quit date: 4/8/2015   • Smokeless tobacco: Never Used      Comment: quit 1 year ago   • Alcohol use No       Family Status   Relation Status   • Fa (Not Specified)   • PAunt (Not Specified)   • PUnc (Not Specified)     Family History   Problem Relation Age of Onset   • Heart Disease Father    • Hypertension Paternal Aunt    • Diabetes Paternal Uncle        Review of Systems:    Constitutional ROS: No unexpected change in weight, No weakness, No fatigue  Eye ROS: No recent significant change in vision, No eye pain, redness, discharge  Ear ROS: No drainage, No tinnitus or vertigo, No recent change in hearing  Mouth/Throat ROS: No teeth or gum problems, No bleeding gums, No tongue complaints  Neck ROS: No swollen glands, No significant pain in neck  Pulmonary ROS: No chronic cough, sputum, or hemoptysis, No dyspnea on exertion, No wheezing  Cardiovascular ROS: No diaphoresis, No edema, No palpitations  Gastrointestinal ROS: No change in bowel habits, No significant change in appetite, No nausea, vomiting, diarrhea, or constipation  Musculoskeletal/Extremities ROS: Right shoulder pain.  Hematologic/Lymphatic ROS: No chills, No night sweats, No  weight loss  Skin/Integumentary ROS: No edema, No evidence of rash, No itching      Exam:  Pulse 81, temperature 36.9 °C (98.5 °F), temperature source Temporal, resp. rate 18, weight 112.5 kg (248 lb), SpO2 96 %.  General: Well developed, well nourished. No distress.  Pulmonary: Unlabored respiratory effort.    Cardiovascular: Radial pulses are strong and equal bilaterally.  Extremities: Full painful range of motion of the right shoulder but does have full range of motion.  Tenderness noted over the AC joint and deltoid without associated soft tissue swelling, erythema, ecchymosis, or changes noted.   strength is strong and equal bilaterally.  Neurologic: Grossly nonfocal. No facial asymmetry noted.  Skin: Warm, dry, good turgor. No rashes in visible areas.   Psych: Normal mood. Alert and oriented x3. Judgment and insight is normal.    Right shoulder x-ray, per my read:  No acute fracture or dislocation noted    Assessment/Plan:  Discussed appropriate dosing of ibuprofen/acetaminophen as needed for pain.  Recommend adding ice, heat, and over-the-counter muscle rubs for additional pain relief.  Return to clinic in 1 week for reevaluation, sooner for any significant changes in symptoms.  Refer to D 39 for restrictions.  1. Contusion of right shoulder, initial encounter  DX-SHOULDER 2+ RIGHT

## 2019-03-08 NOTE — LETTER
"EMPLOYEE’S CLAIM FOR COMPENSATION/ REPORT OF INITIAL TREATMENT  FORM C-4    EMPLOYEE’S CLAIM - PROVIDE ALL INFORMATION REQUESTED   First Name  Leonel Last Name  Kwesi Birthdate                    1970                Sex  male Claim Number   Home Address  [address] Age  48 y.o. Height  5' 11\"  Weight  112.5 kg (248 lb) [facility]     [facility] [place] Zip  [address] Telephone  [phone] (home)    Mailing Address  [address] [place]  [address] Primary Language Spoken  English    Insurer   Third Party   [employer]   Employee's Occupation (Job Title) When Injury or Occupational Disease Occurred  [employer]    Employer's Name  [employer]  Telephone  [phone]    Employer Address  [address]  Zip  [address]    Date of Injury  3/7/2019               Hour of Injury  3:55 PM Date Employer Notified  3/7/2019 Last Day of Work after Injury or Occupational Disease  3/7/2019 Supervisor to Whom Injury Reported  Kyaw Sousa   Address or Location of Accident (if applicable)  [[facility]]   What were you doing at the time of accident? (if applicable)  working wet jet taking a pallet    How did this injury or occupational disease occur? (Be specific an answer in detail. Use additional sheet if necessary)  taking a pallet to another station slipped fell on right shoulder   If you believe that you have an occupational disease, when did you first have knowledge of the disability and it relationship to your employment?  n/a Witnesses to the Accident  no      Nature of Injury or Occupational Disease  Workers' Compensation  Part(s) of Body Injured or Affected  Shoulder (R), ,     I certify that the above is true and correct to the best of my knowledge and that I have provided this information in order to obtain the benefits of [place]’s [place] Insurance and " [place]  I hereby authorize any physician, chiropractor, surgeon, practitioner, or other person, any hospital, including [facility] or [facility], any medical service organization, any insurance company, or other institution or organization to release to each other, any medical or other information, including benefits paid or payable, pertinent to this injury or disease, except information relative to diagnosis, treatment and/or counseling for AIDS, psychological conditions, alcohol or controlled substances, for which I must give specific authorization.  A Photostat of this authorization shall be as valid as the original.     Date 3/8/2019   [facility]    Employee’s Signature   THIS REPORT MUST BE COMPLETED AND MAILED WITHIN 3 WORKING DAYS OF TREATMENT   Place  [facility]  Name of Facility  [facility]   Date  3/8/2019 Diagnosis  (S40.011A) Contusion of right shoulder, initial encounter Is there evidence the injured employee was under the influence of alcohol and/or another controlled substance at the time of accident?   Hour  10:15 AM Description of Injury or Disease  The encounter diagnosis was Contusion of right shoulder, initial encounter. No   Treatment  Assessment/Plan:  Discussed appropriate dosing of ibuprofen/acetaminophen as needed for pain.  Recommend adding ice, heat, and over-the-counter muscle rubs for additional pain relief.  Return to clinic in 1 week for reevaluation, sooner for any significant changes in symptoms.  Refer to D 39 for restrictions.  1. Contusion of right shoulder, initial encounter  DX-SHOULDER 2+ RIGHT      Have you advised the patient to remain off work five days or more? No   X-Ray Findings  Negative   If Yes   From Date  To Date      From information given by the employee, together with medical evidence, can you directly connect this injury or occupational disease  "as job incurred?  Yes If No Full Duty  No Modified Duty  Yes   Is additional medical care by a physician indicated?  Yes If Modified Duty, Specify any Limitations / Restrictions  See D39   Do you know of any previous injury or disease contributing to this condition or occupational disease?                            No   Date  3/8/2019 Print Doctor’s Name Henri Morgan P.A.-C. I certify the employer’s copy of  this form was mailed on:   Address  [address] Use [address]     [place]  [address]    Provider’s Tax ID Number  [id number] Telephone  Dept: [phone]        e-HENRI England P.A.-C.   e-Signature: Dr. Himanshu Pantoja, Medical Director Degree  PAC        ORIGINAL-TREATING PHYSICIAN OR CHIROPRACTOR    PAGE 2-INSURER/TPA    PAGE 3-EMPLOYER    PAGE 4-EMPLOYEE             Form C-4 (rev10/07)              BRIEF DESCRIPTION OF RIGHTS AND BENEFITS  (Pursuant to [place]RS 616C.050)    Notice of Injury or Occupational Disease (Incident Report Form C-1): If an injury or occupational disease (OD) arises out of and in the  course of employment, you must provide written notice to your employer as soon as practicable, but no later than 7 days after the accident or  OD. Your employer shall maintain a sufficient supply of the required forms.    Claim for Compensation (Form C-4): If medical treatment is sought, the form C-4 is available at the place of initial treatment. A completed  \"Claim for Compensation\" (Form C-4) must be filed within 90 days after an accident or OD. The treating physician or chiropractor must,  within 3 working days after treatment, complete and mail to the employer, the employer's insurer and third-party , the Claim for  Compensation.    Medical Treatment: If you require medical treatment for your on-the-job injury or OD, you may be required to select a physician or  chiropractor from a list provided by your workers’ compensation insurer, if it " has contracted with an Organization for Managed Care (MCO) or  Preferred Provider Organization (PPO) or providers of health care. If your employer has not entered into a contract with an MCO or PPO, you  may select a physician or chiropractor from the Panel of Physicians and Chiropractors. Any medical costs related to your industrial injury or  OD will be paid by your insurer.    Temporary Total Disability (TTD): If your doctor has certified that you are unable to work for a period of at least 5 consecutive days, or 5  cumulative days in a 20-day period, or places restrictions on you that your employer does not accommodate, you may be entitled to TTD  compensation.    Temporary Partial Disability (TPD): If the wage you receive upon reemployment is less than the compensation for TTD to which you are  entitled, the insurer may be required to pay you TPD compensation to make up the difference. TPD can only be paid for a maximum of 24  months.    Permanent Partial Disability (PPD): When your medical condition is stable and there is an indication of a PPD as a result of your injury or  OD, within 30 days, your insurer must arrange for an evaluation by a rating physician or chiropractor to determine the degree of your PPD. The  amount of your PPD award depends on the date of injury, the results of the PPD evaluation and your age and wage.    Permanent Total Disability (PTD): If you are medically certified by a treating physician or chiropractor as permanently and totally disabled  and have been granted a PTD status by your insurer, you are entitled to receive monthly benefits not to exceed 66 2/3% of your average  monthly wage. The amount of your PTD payments is subject to reduction if you previously received a PPD award.    Vocational Rehabilitation Services: You may be eligible for vocational rehabilitation services if you are unable to return to the job due to a  permanent physical impairment or permanent restrictions  as a result of your injury or occupational disease.    Transportation and Per [place] Reimbursement: You may be eligible for travel expenses and per shasta associated with medical treatment.    Reopening: You may be able to reopen your claim if your condition worsens after claim closure.    Appeal Process: If you disagree with a written determination issued by the insurer or the insurer does not respond to your request, you may  appeal to the Department of Administration, , by following the instructions contained in your determination letter. You must  appeal the determination within 70 days from the date of the determination letter at [address], [address], [place], [place]  [address], or [address], [address], [place], [place] [address]. If you disagree with the  decision, you may appeal to the  Department of Administration, . You must file your appeal within 30 days from the date of the  decision  letter at [address], [address], [place], [place] [address], or [address], [address], [place], [place] [address]. If you  disagree with a decision of an , you may file a petition for judicial review with the District Court. You must do so within 30  days of the Appeal Officer’s decision. You may be represented by an  at your own expense or you may contact the [facility] for possible  representation.    [place]  for Injured Workers (NAIW): If you disagree with a  decision, you may request that NAIW represent you  without charge at an  Hearing. For information regarding denial of benefits, you may contact the [facility] at: [address], [address] [address], [phone], or [address], [address] [address], [phone]    To File a Complaint with the Division: If you wish to file a complaint with the  of the Division of  Industrial Relations (DIR),  please contact the Workers’ Compensation Section, [address], [address], [place], [place] [address], telephone (638) [phone], or  [address], [address], [place], [place] [address], telephone [phone]) [phone].    For assistance with Workers’ Compensation Issues: you may contact the Office of the [facility] Consumer Health Assistance, [address] [place] [address], [address], [place], [place] [address], Toll Free 1-149.309.3702, Web site: http://[employer].nv., E-mail  [email or web address].                                                                                                                                                                                                                                   __________________________________________________________________                                                                   ________ 3/8/2019_________                Employee Name / Signature                                                                                                                                                       Date                                                                                                                                                                                                     D-2 (rev. 10/07)

## 2019-03-09 LAB
AMP AMPHETAMINE: NORMAL
BAR BARBITURATES: NORMAL
BREATH ALCOHOL COMMENT: NORMAL
BZO BENZODIAZEPINES: NORMAL
COC COCAINE: NORMAL
INT CON NEG: NORMAL
INT CON POS: NORMAL
MDMA ECSTASY: NORMAL
MET METHAMPHETAMINES: NORMAL
MTD METHADONE: NORMAL
OPI OPIATES: NORMAL
OXY OXYCODONE: NORMAL
PCP PHENCYCLIDINE: NORMAL
POC BREATHALIZER: 0 PERCENT (ref 0–0.01)
POC URINE DRUG SCREEN OCDRS: NORMAL
THC: NORMAL

## 2019-03-15 ENCOUNTER — OCCUPATIONAL MEDICINE (OUTPATIENT)
Dept: URGENT CARE | Facility: PHYSICIAN GROUP | Age: 49
End: 2019-03-15
Payer: COMMERCIAL

## 2019-03-15 VITALS
SYSTOLIC BLOOD PRESSURE: 170 MMHG | DIASTOLIC BLOOD PRESSURE: 108 MMHG | HEIGHT: 71 IN | OXYGEN SATURATION: 98 % | HEART RATE: 90 BPM | RESPIRATION RATE: 16 BRPM | WEIGHT: 252 LBS | BODY MASS INDEX: 35.28 KG/M2 | TEMPERATURE: 99.2 F

## 2019-03-15 DIAGNOSIS — S40.011D CONTUSION OF RIGHT SHOULDER, SUBSEQUENT ENCOUNTER: ICD-10-CM

## 2019-03-15 PROCEDURE — 99213 OFFICE O/P EST LOW 20 MIN: CPT | Performed by: FAMILY MEDICINE

## 2019-03-15 NOTE — LETTER
[address] [place] [address]  Phone:  [phone] - Fax:  [phone]   [facility] Progress Report and Disability Certification  Date of Service: 3/15/2019   No Show:  No  Date / Time of Next Visit:     Claim Information   Patient Name: Leonel Orosco  Claim Number:     Employer: [employer]  Date of Injury: 3/7/2019     Insurer / TPA: [employer]  ID / SSN:     Occupation: [employer]  Diagnosis: The encounter diagnosis was Contusion of right shoulder, subsequent encounter.    Medical Information   Related to Industrial Injury? Yes    Subjective Complaints:  DOI: 3/7/19. Compared to visit 3/8/19, right shoulder is better. Work fired him on 3/11/19 - they state he falsely accused work of this injury. Other employees saw him at [facility] 3/8/19 getting some anti-inflammatory medication and was told if he was good enough to be at [facility], he was good enough to work.   Objective Findings: Normal range of motion of shoulders. No muscular atrophy or weakness.   Pre-Existing Condition(s):     Assessment:   Condition Improved    Status: Discharged /  MMI  Permanent Disability:No    Plan:      Diagnostics:      Comments:       Disability Information   Status: Released to Full Duty    From:     Through:   Restrictions are:     Physical Restrictions   Sitting:    Standing:    Stooping:    Bending:      Squatting:    Walking:    Climbing:    Pushing:      Pulling:    Other:    Reaching Above Shoulder (L):   Reaching Above Shoulder (R):       Reaching Below Shoulder (L):    Reaching Below Shoulder (R):      Not to exceed Weight Limits   Carrying(hrs):   Weight Limit(lb):   Lifting(hrs):   Weight  Limit(lb):     Comments:      Repetitive Actions   Hands: i.e. Fine Manipulations from Grasping:     Feet: i.e. Operating Foot Controls:     Driving / Operate Machinery:     Physician Name: Juan Judd M.D. Physician Signature: Megha  MONTANA FRANCIS M.D. e-Signature: Dr. Himanshu Pantoja, Medical Director   Clinic Name / Location: [address] [place] [address] Clinic Phone Number: Dept: [phone]   Appointment Time: 3:40 Pm Visit Start Time: 4:04 PM   Check-In Time:  3:39 Pm Visit Discharge Time:  4:22 pm   4:22 PM   Original-Treating Physician or Chiropractor    Page 2-Insurer/TPA    Page 3-Employer    Page 4-Employee

## 2019-03-15 NOTE — PROGRESS NOTES
Chief Complaint:    Chief Complaint   Patient presents with   • Shoulder Injury     right        History of Present Illness:    DOI: 3/7/19. Compared to visit 3/8/19, right shoulder is better. Work fired him on 3/11/19 - they state he falsely accused work of this injury. Other employees saw him at [facility] 3/8/19 getting some anti-inflammatory medication and was told if he was good enough to be at [facility], he was good enough to work.      Review of Systems:    Constitutional: Negative for fever, chills, and diaphoresis.   Eyes: Negative for change in vision, photophobia, pain, redness, and discharge.  ENT: Negative for ear pain, ear discharge, hearing loss, tinnitus, nasal congestion, nosebleeds, and sore throat.    Respiratory: Negative for cough, hemoptysis, sputum production, shortness of breath, wheezing, and stridor.    Cardiovascular: Negative for chest pain, palpitations, orthopnea, claudication, leg swelling, and PND.   Gastrointestinal: Negative for abdominal pain, nausea, vomiting, diarrhea, constipation, blood in stool, and melena.   Genitourinary: Negative for dysuria, urinary urgency, urinary frequency, hematuria, and flank pain.   Musculoskeletal: See HPI.   Skin: Negative for rash and itching.   Neurological: Negative for dizziness, tingling, tremors, sensory change, speech change, focal weakness, seizures, loss of consciousness, and headaches.   Endo: Negative for polydipsia.   Heme: Does not bruise/bleed easily.   Psychiatric/Behavioral: Negative for depression, suicidal ideas, hallucinations, memory loss and substance abuse. The patient is not nervous/anxious and does not have insomnia.        Past Medical History:    Past Medical History:   Diagnosis Date   • Cellulitis     both lower legs   • Diabetes    • Hypercholesteremia    • Hypertension      Past Surgical History:    Past Surgical History:   Procedure Laterality Date   • OTHER ABDOMINAL SURGERY      exploratory; appendectomy   • OTHER  "ORTHOPEDIC SURGERY      both knees x 3     Social History:    Social History     Social History   • Marital status:      Spouse name: N/A   • Number of children: N/A   • Years of education: N/A     Occupational History   • Not on file.     Social History Main Topics   • Smoking status: Former Smoker     Years: 5.00     Types: Cigarettes     Quit date: 4/8/2015   • Smokeless tobacco: Never Used      Comment: quit 1 year ago   • Alcohol use No   • Drug use: No   • Sexual activity: Yes     Partners: Female      Comment:      Other Topics Concern   • Not on file     Social History Narrative    , moved from [place] [facility] to [facility] but living in hotel in [place] because [place] living conditions were unacceptable. Not employed, recently too ill to go to job interviews that he had set up.     Family History:    Family History   Problem Relation Age of Onset   • Heart Disease Father    • Hypertension Paternal Aunt    • Diabetes Paternal Uncle      Medications:    Current Outpatient Prescriptions on File Prior to Visit   Medication Sig Dispense Refill   • lisinopril (PRINIVIL) 20 MG Tab Take 1 Tab by mouth every day. 30 Tab 11     No current facility-administered medications on file prior to visit.      Allergies:    Allergies   Allergen Reactions   • Penicillins Anaphylaxis     Pt tolerates Keflex   • Hydrocodone Itching     Made him itch all over   • Zinc Itching       Vitals:    Vitals:    03/15/19 1606   BP: (!) 170/108   BP Location: Left arm   Patient Position: Sitting   Pulse: 90   Resp: 16   Temp: 37.3 °C (99.2 °F)   TempSrc: Temporal   SpO2: 98%   Weight: 114.3 kg (252 lb)   Height: 1.803 m (5' 11\")       Physical Exam:    Constitutional: Vital signs reviewed. Appears well-developed and well-nourished. No acute distress.   Eyes: Sclera white, conjunctivae clear.  ENT: External ears normal. Hearing normal.  Pulmonary/Chest: Respirations non-labored.  Musculoskeletal: Normal gait. Normal range of " motion. No muscular atrophy or weakness.  Neurological: Alert and oriented to person, place, and time. Muscle tone normal. Coordination normal. Light touch and sensation normal.   Skin: No rashes or lesions. Warm, dry, normal turgor.  Psychiatric: Normal mood and affect. Behavior is normal. Judgment and thought content normal.       Assessment / Plan:    1. Contusion of right shoulder, subsequent encounter      Discussed with him DDX, management options, and risks, benefits, and alternatives to treatment plan agreed upon.    Full duty.    Discharged / MMI.

## 2019-07-11 ENCOUNTER — HOSPITAL ENCOUNTER (OUTPATIENT)
Dept: RADIOLOGY | Facility: MEDICAL CENTER | Age: 49
End: 2019-07-11
Attending: SURGERY
Payer: MEDICAID

## 2019-07-11 DIAGNOSIS — L97.909 STASIS ULCER OF LOWER EXTREMITY, UNSPECIFIED LATERALITY (HCC): ICD-10-CM

## 2019-07-11 DIAGNOSIS — L97.909 VENOUS STASIS ULCER WITH VARICOSE VEINS OF LOWER EXTREMITY (HCC): ICD-10-CM

## 2019-07-11 DIAGNOSIS — I83.009 VENOUS STASIS ULCER WITH VARICOSE VEINS OF LOWER EXTREMITY (HCC): ICD-10-CM

## 2019-07-11 DIAGNOSIS — I83.009 STASIS ULCER OF LOWER EXTREMITY, UNSPECIFIED LATERALITY (HCC): ICD-10-CM

## 2019-07-11 PROCEDURE — 93970 EXTREMITY STUDY: CPT

## 2019-08-21 ENCOUNTER — TELEPHONE (OUTPATIENT)
Dept: SCHEDULING | Facility: IMAGING CENTER | Age: 49
End: 2019-08-21

## 2019-08-22 ENCOUNTER — OFFICE VISIT (OUTPATIENT)
Dept: URGENT CARE | Facility: PHYSICIAN GROUP | Age: 49
End: 2019-08-22
Payer: MEDICAID

## 2019-08-22 VITALS
TEMPERATURE: 97.6 F | RESPIRATION RATE: 16 BRPM | BODY MASS INDEX: 35.29 KG/M2 | WEIGHT: 253 LBS | DIASTOLIC BLOOD PRESSURE: 94 MMHG | HEART RATE: 88 BPM | OXYGEN SATURATION: 99 % | SYSTOLIC BLOOD PRESSURE: 176 MMHG

## 2019-08-22 DIAGNOSIS — E11.8 TYPE 2 DIABETES MELLITUS WITH COMPLICATION, WITHOUT LONG-TERM CURRENT USE OF INSULIN (HCC): ICD-10-CM

## 2019-08-22 DIAGNOSIS — I99.8 VASCULAR INSUFFICIENCY: ICD-10-CM

## 2019-08-22 DIAGNOSIS — E11.42 DIABETIC PERIPHERAL NEUROPATHY (HCC): ICD-10-CM

## 2019-08-22 DIAGNOSIS — I10 ESSENTIAL HYPERTENSION: ICD-10-CM

## 2019-08-22 LAB
HBA1C MFR BLD: 6.5 % (ref 0–5.6)
INT CON NEG: ABNORMAL
INT CON POS: ABNORMAL

## 2019-08-22 PROCEDURE — 99215 OFFICE O/P EST HI 40 MIN: CPT | Performed by: PHYSICIAN ASSISTANT

## 2019-08-22 PROCEDURE — 83036 HEMOGLOBIN GLYCOSYLATED A1C: CPT | Performed by: PHYSICIAN ASSISTANT

## 2019-08-22 RX ORDER — CYCLOBENZAPRINE HCL 10 MG
TABLET ORAL
Refills: 1 | COMMUNITY
Start: 2019-06-27 | End: 2019-10-16 | Stop reason: SDUPTHER

## 2019-08-22 RX ORDER — LANCETS 33 GAUGE
EACH MISCELLANEOUS
Refills: 3 | COMMUNITY
Start: 2019-06-04

## 2019-08-22 RX ORDER — GABAPENTIN 300 MG/1
CAPSULE ORAL
Refills: 1 | COMMUNITY
Start: 2019-07-27 | End: 2019-10-16 | Stop reason: SDUPTHER

## 2019-08-22 RX ORDER — SULFAMETHOXAZOLE AND TRIMETHOPRIM 800; 160 MG/1; MG/1
1 TABLET ORAL
Refills: 1 | COMMUNITY
Start: 2019-08-11 | End: 2019-10-16 | Stop reason: SDUPTHER

## 2019-08-22 RX ORDER — TRAMADOL HYDROCHLORIDE 50 MG/1
TABLET ORAL
COMMUNITY
Start: 2014-07-03

## 2019-08-22 RX ORDER — BLOOD-GLUCOSE METER
EACH MISCELLANEOUS
Refills: 0 | COMMUNITY
Start: 2019-06-04

## 2019-08-22 RX ORDER — LISINOPRIL 20 MG/1
TABLET ORAL
COMMUNITY
Start: 2014-07-03 | End: 2019-10-16

## 2019-08-22 RX ORDER — LISINOPRIL 10 MG/1
TABLET ORAL
COMMUNITY
Start: 2014-07-03 | End: 2019-10-16

## 2019-08-22 NOTE — LETTER
August 22, 2019         Patient: Leonel Orosco   YOB: 1970   Date of Visit: 8/22/2019           To Whom it May Concern:    Leonel Orosco was seen in my clinic on 8/22/2019. He may return to work on Saturday, 8/24/19.    If you have any questions or concerns, please don't hesitate to call.        Sincerely,           Reshma Shelley P.A.-C.  Electronically Signed

## 2019-08-22 NOTE — PROGRESS NOTES
Subjective:      Leonel Orosco is a 49 y.o. male who presents with Blood Pressure Problem (doesnt think BP meds work )          48 y/o male presents to  for multiple new problems. He has known hx of type II diabetes with chronic skin ulcers, peripheral neuropathy, and HTN. Patient states that he was being follow by wound care, vascular surgeon, and primary care but has recently stopped following up.     Patient c/o redness, swelling and pain related to chronic skin ulcers BLE. Denies fevers and states that his sores have been improving some. He reports taking Bactrim for the last month which was written by vascular surgeon. He states this is his third course of Bactrim in the last month. Patient states he has appointment scheduled to establish care with [facility] in about one month and new PCP appointment in November.   Patient reports his blood glucose taken PTA was 105 at 130pm.   Patient is also requesting medication refill for Metformin and increased dose of his Lisinopril 20mg QD. Patient is out of Metformin 500mg. States that his dose of Metformin was increased to 1000mg in April but has been only taking 500mg.   Lisinopril was increased from 10mg to 20mg 5/31/2019. His Bp is 176/94 in clinic today.         Review of Systems   Constitutional: Negative for chills, fever and malaise/fatigue.   Eyes: Negative for blurred vision and double vision.   Respiratory: Negative for cough, sputum production and shortness of breath.    Cardiovascular: Positive for leg swelling. Negative for chest pain, palpitations and claudication.   Gastrointestinal: Negative for abdominal pain, diarrhea, nausea and vomiting.   Genitourinary: Negative for dysuria.   Musculoskeletal: Negative for myalgias.   Skin: Negative for rash.        Chronic skin ulcers over BLE with associated redness, swelling and pain    Neurological: Negative for dizziness, sensory change, speech change, focal weakness and headaches.    Endo/Heme/Allergies: Negative for polydipsia.       Past Medical History:   Diagnosis Date   • Cellulitis     both lower legs   • Diabetes    • Hypercholesteremia    • Hypertension      Current Outpatient Medications on File Prior to Visit   Medication Sig Dispense Refill   • Blood Glucose Monitoring Suppl (ONE TOUCH ULTRA 2) w/Device Kit USE AS DIRECTED  0   • cyclobenzaprine (FLEXERIL) 10 MG Tab TAKE 1 TABLET BY MOUTH THREE TIMES DAILY AS NEEDED FOR SPASM.  1   • gabapentin (NEURONTIN) 300 MG Cap TAKE 1 CAPSULE BY MOUTH THREE TIMES DAILY FOR 30 DAYS FOR PAIN  1   • ONE TOUCH ULTRA TEST strip USE 1 STRIP TO CHECK GLUCOSE ONCE DAILY  3   • ONETOUCH DELICA LANCETS 33G Misc USE 1 TO CHECK GLUCOSE ONCE DAILY  3   • metformin (GLUCOPHAGE) 1000 MG tablet METFORMIN HCL 1000 MG TABS     • tramadol (ULTRAM) 50 MG Tab TRAMADOL HCL 50 MG TABS     • lisinopril (PRINIVIL) 10 MG Tab LISINOPRIL 10 MG TABS     • lisinopril (PRINIVIL) 20 MG Tab LISINOPRIL 20 MG TABS     • lisinopril (PRINIVIL) 20 MG Tab Take 1 Tab by mouth every day. 30 Tab 11   • sulfamethoxazole-trimethoprim (BACTRIM DS) 800-160 MG tablet Take 1 Tab by mouth.  1     No current facility-administered medications on file prior to visit.      Allergies   Allergen Reactions   • Penicillins Anaphylaxis     Pt tolerates Keflex   • Hydrocodone Itching     Made him itch all over   • Zinc Itching     Social History     Tobacco Use   • Smoking status: Former Smoker     Years: 5.00     Types: Cigarettes     Last attempt to quit: 2015     Years since quittin.3   • Smokeless tobacco: Never Used   • Tobacco comment: quit 1 year ago   Substance Use Topics   • Alcohol use: No     Alcohol/week: 0.0 oz      Objective:     BP (!) 176/94   Pulse 88   Temp 36.4 °C (97.6 °F)   Resp 16   Wt 114.8 kg (253 lb)   SpO2 99%   BMI 35.29 kg/m²      Physical Exam   Constitutional: He is oriented to person, place, and time. He appears well-developed and well-nourished. No  distress.   HENT:   Head: Normocephalic and atraumatic.   Eyes: Conjunctivae and EOM are normal.   Neck: Normal range of motion. Neck supple.   Cardiovascular: Normal rate, regular rhythm and normal heart sounds.   Pulmonary/Chest: Effort normal and breath sounds normal. No respiratory distress.   Abdominal: Soft.   Musculoskeletal: Normal range of motion.   Neurological: He is alert and oriented to person, place, and time. No cranial nerve deficit or sensory deficit.   Skin:   Chronic skin ulcers over BLE extending up to the knees, all in various stages of healing. +wheeping, surrounding erythema, warmth, and non-pitting edema.    Psychiatric: He has a normal mood and affect. His behavior is normal. Judgment and thought content normal.   Vitals reviewed.             Results for orders placed or performed in visit on 08/22/19   POCT  A1C   Result Value Ref Range    Glycohemoglobin 6.5 (A) 0.0 - 5.6 %    Internal Control Negative      Internal Control Positive         Assessment/Plan:     1. Type 2 diabetes mellitus with complication, without long-term current use of insulin (HCC)  POCT  A1C    CBC WITH DIFFERENTIAL    Comp Metabolic Panel    MICROALBUMIN CREAT RATIO URINE    HEMOGLOBIN A1C    Lipid Profile    metFORMIN (GLUCOPHAGE) 500 MG Tab   2. Vascular insufficiency  POCT  A1C    CBC WITH DIFFERENTIAL    Comp Metabolic Panel    MICROALBUMIN CREAT RATIO URINE    HEMOGLOBIN A1C    Lipid Profile   3. Diabetic peripheral neuropathy (HCC)  POCT  A1C    CBC WITH DIFFERENTIAL    Comp Metabolic Panel    MICROALBUMIN CREAT RATIO URINE    HEMOGLOBIN A1C    Lipid Profile   4. Essential hypertension  CBC WITH DIFFERENTIAL    Comp Metabolic Panel    Lipid Profile     Patient given refill of Metformin 500mg with 1 refill. POCT A1C is 6.5.   Recommend patient to establish care with primary care provider.  Follow-up with vascular surgeon as scheduled.  Labs to be completed and reviewed.  Will consider increased dose of  lisinopril once labs are resulted. Will call patient pending results.   Patient given   PT should follow up with PCP in 1-2 days for re-evaluation if symptoms have not improved.  Discussed red flags and reasons to return to UC or ED.  Pt and/or family verbalized understanding of diagnosis and follow up instructions and was offered informational handout on diagnosis.  PT discharged.

## 2019-08-27 ASSESSMENT — ENCOUNTER SYMPTOMS
COUGH: 0
DIZZINESS: 0
CLAUDICATION: 0
CHILLS: 0
DIARRHEA: 0
SPUTUM PRODUCTION: 0
SENSORY CHANGE: 0
POLYDIPSIA: 0
BLURRED VISION: 0
FOCAL WEAKNESS: 0
SPEECH CHANGE: 0
SHORTNESS OF BREATH: 0
VOMITING: 0
MYALGIAS: 0
PALPITATIONS: 0
HEADACHES: 0
FEVER: 0
DOUBLE VISION: 0
ABDOMINAL PAIN: 0
NAUSEA: 0

## 2019-10-01 ENCOUNTER — OFFICE VISIT (OUTPATIENT)
Dept: NEPHROLOGY | Facility: MEDICAL CENTER | Age: 49
End: 2019-10-01
Payer: MEDICAID

## 2019-10-01 VITALS
RESPIRATION RATE: 16 BRPM | SYSTOLIC BLOOD PRESSURE: 144 MMHG | DIASTOLIC BLOOD PRESSURE: 88 MMHG | WEIGHT: 254 LBS | HEIGHT: 71 IN | TEMPERATURE: 99 F | BODY MASS INDEX: 35.56 KG/M2 | HEART RATE: 82 BPM | OXYGEN SATURATION: 96 %

## 2019-10-01 DIAGNOSIS — I73.9 PVD (PERIPHERAL VASCULAR DISEASE) (HCC): ICD-10-CM

## 2019-10-01 DIAGNOSIS — N18.9 CHRONIC KIDNEY DISEASE, UNSPECIFIED CKD STAGE: ICD-10-CM

## 2019-10-01 DIAGNOSIS — I10 ESSENTIAL HYPERTENSION: ICD-10-CM

## 2019-10-01 DIAGNOSIS — E11.69 TYPE 2 DIABETES MELLITUS WITH OTHER SPECIFIED COMPLICATION, WITHOUT LONG-TERM CURRENT USE OF INSULIN (HCC): ICD-10-CM

## 2019-10-01 PROCEDURE — 99204 OFFICE O/P NEW MOD 45 MIN: CPT | Performed by: INTERNAL MEDICINE

## 2019-10-01 RX ORDER — LISINOPRIL 40 MG/1
40 TABLET ORAL DAILY
Qty: 90 TAB | Refills: 3 | Status: SHIPPED | OUTPATIENT
Start: 2019-10-01

## 2019-10-01 ASSESSMENT — ENCOUNTER SYMPTOMS
SHORTNESS OF BREATH: 0
VOMITING: 0
HYPERTENSION: 1
COUGH: 0
NAUSEA: 0
CHILLS: 0
FEVER: 0

## 2019-10-01 NOTE — PROGRESS NOTES
Subjective:      Leonel Orosco is a 49 y.o. male who presents with Hypertension and Chronic Kidney Disease            Patient has a history of long-standing diabetes, not on any medical treatment.  Had a history of acute kidney injury back in 2015 secondary to cellulitis.  Patient also has a history of peripheral vascular disease with recent left lower extremity ulcer, has been evaluated by vascular surgeon, and he is been referred to wound clinic.  Patient has no recent use of NSAIDs or IV contrast    Hypertension   This is a chronic problem. The current episode started more than 1 year ago. The problem has been waxing and waning since onset. The problem is uncontrolled. Associated symptoms include peripheral edema. Pertinent negatives include no chest pain, malaise/fatigue or shortness of breath. Risk factors for coronary artery disease include male gender, obesity and diabetes mellitus. Past treatments include ACE inhibitors. The current treatment provides moderate improvement. Compliance problems include diet.  Hypertensive end-organ damage includes kidney disease. Identifiable causes of hypertension include chronic renal disease.   Chronic Kidney Disease   This is a chronic problem. The current episode started more than 1 year ago. The problem occurs intermittently. The problem has been waxing and waning. Pertinent negatives include no chest pain, chills, coughing, fever, nausea, urinary symptoms or vomiting.       Review of Systems   Constitutional: Negative for chills, fever and malaise/fatigue.   Respiratory: Negative for cough and shortness of breath.    Cardiovascular: Positive for leg swelling. Negative for chest pain.   Gastrointestinal: Negative for nausea and vomiting.   Genitourinary: Negative for dysuria, frequency and urgency.   All other systems reviewed and are negative.         Objective:     /88 (BP Location: Right arm, Patient Position: Sitting, BP Cuff Size: Large adult)   Pulse 82   " Temp 37.2 °C (99 °F) (Temporal)   Resp 16   Ht 1.803 m (5' 11\")   Wt 115.2 kg (254 lb)   SpO2 96%   BMI 35.43 kg/m²      Physical Exam   Constitutional: He is oriented to person, place, and time. He appears well-developed and well-nourished. No distress.   HENT:   Right Ear: External ear normal.   Left Ear: External ear normal.   Nose: Nose normal.   Mouth/Throat: No oropharyngeal exudate.   Eyes: Conjunctivae are normal. Right eye exhibits no discharge. Left eye exhibits no discharge. No scleral icterus.   Neck: Normal range of motion. No JVD present. No tracheal deviation present. No thyromegaly present.   Cardiovascular: Normal rate and regular rhythm.   Pulmonary/Chest: Effort normal and breath sounds normal. No respiratory distress. He has no wheezes.   Abdominal: Bowel sounds are normal. He exhibits no distension. There is no tenderness.   Musculoskeletal: He exhibits edema. He exhibits no tenderness or deformity.   Neurological: He is alert and oriented to person, place, and time. No cranial nerve deficit.   Skin: Skin is warm and dry. He is not diaphoretic. There is erythema.   Left lower extremity wound ulcer   Psychiatric: He has a normal mood and affect. His behavior is normal.   Nursing note and vitals reviewed.    Past Medical History:   Diagnosis Date   • Cellulitis     both lower legs   • Diabetes    • Hypercholesteremia    • Hypertension      Social History     Socioeconomic History   • Marital status:      Spouse name: Not on file   • Number of children: Not on file   • Years of education: Not on file   • Highest education level: Not on file   Occupational History   • Not on file   Social Needs   • Financial resource strain: Not on file   • Food insecurity:     Worry: Not on file     Inability: Not on file   • Transportation needs:     Medical: Not on file     Non-medical: Not on file   Tobacco Use   • Smoking status: Former Smoker     Years: 5.00     Types: Cigarettes     Last attempt " to quit: 2015     Years since quittin.4   • Smokeless tobacco: Never Used   • Tobacco comment: quit 1 year ago   Substance and Sexual Activity   • Alcohol use: No     Alcohol/week: 0.0 oz   • Drug use: No   • Sexual activity: Yes     Partners: Female     Comment:    Lifestyle   • Physical activity:     Days per week: Not on file     Minutes per session: Not on file   • Stress: Not on file   Relationships   • Social connections:     Talks on phone: Not on file     Gets together: Not on file     Attends Orthodoxy service: Not on file     Active member of club or organization: Not on file     Attends meetings of clubs or organizations: Not on file     Relationship status: Not on file   • Intimate partner violence:     Fear of current or ex partner: Not on file     Emotionally abused: Not on file     Physically abused: Not on file     Forced sexual activity: Not on file   Other Topics Concern   • Not on file   Social History Narrative    , moved from [facility] but living in hot in [facility] because [place] living conditions were unacceptable. Not employed, recently too ill to go to job interviews that he had set up.     Family History   Problem Relation Age of Onset   • Heart Disease Father    • Hypertension Paternal Aunt    • Diabetes Paternal Uncle      No results for input(s): HGB, ALBUMIN, HDL, TRIGLYCERIDE, SODIUM, POTASSIUM, CHLORIDE, CO2, BUN, CREATININE, PHOSPHORUS in the last 8544 hours.    Invalid input(s): CHOLESTEROL, LDL CLACULATED, URIC ACID, INTACT PTH, PRO CREAT RATIO  Labs from  were reviewed  Hemoglobin A1c from 2019 was 6.5         Assessment/Plan:     1. Essential hypertension  Uncontrolled  I advised the patient to be a low-sodium diet  Increase lisinopril to 40 mg daily    2. Chronic kidney disease, unspecified CKD stage  No uremic symptoms  Renal dose of medication  Avoid nephrotoxins  Continue same medication regimen  Check labs including urine  microalbumin to creatinine ratio    3. Type 2 diabetes mellitus with other specified complication, without long-term current use of insulin (HCC)  Continue to optimize diabetes control    4. PVD (peripheral vascular disease) (HCC)

## 2019-10-16 ENCOUNTER — OFFICE VISIT (OUTPATIENT)
Dept: MEDICAL GROUP | Facility: CLINIC | Age: 49
End: 2019-10-16
Payer: MEDICAID

## 2019-10-16 ENCOUNTER — HOSPITAL ENCOUNTER (OUTPATIENT)
Facility: MEDICAL CENTER | Age: 49
End: 2019-10-16
Attending: PHYSICIAN ASSISTANT
Payer: MEDICAID

## 2019-10-16 VITALS
SYSTOLIC BLOOD PRESSURE: 166 MMHG | OXYGEN SATURATION: 99 % | WEIGHT: 249 LBS | HEART RATE: 90 BPM | BODY MASS INDEX: 35.65 KG/M2 | HEIGHT: 70 IN | RESPIRATION RATE: 18 BRPM | TEMPERATURE: 98.4 F | DIASTOLIC BLOOD PRESSURE: 110 MMHG

## 2019-10-16 DIAGNOSIS — E11.622 ULCER OF LOWER EXTREMITY DUE TO DIABETES (HCC): ICD-10-CM

## 2019-10-16 DIAGNOSIS — Z00.00 ENCOUNTER FOR MEDICAL EXAMINATION TO ESTABLISH CARE: ICD-10-CM

## 2019-10-16 DIAGNOSIS — Z23 NEED FOR VACCINATION: ICD-10-CM

## 2019-10-16 DIAGNOSIS — M62.838 MUSCLE SPASM OF BOTH LOWER LEGS: ICD-10-CM

## 2019-10-16 DIAGNOSIS — E66.9 OBESITY (BMI 35.0-39.9 WITHOUT COMORBIDITY): ICD-10-CM

## 2019-10-16 DIAGNOSIS — L03.116 CELLULITIS OF LEFT LEG: ICD-10-CM

## 2019-10-16 DIAGNOSIS — L97.909 ULCER OF LOWER EXTREMITY DUE TO DIABETES (HCC): ICD-10-CM

## 2019-10-16 DIAGNOSIS — E11.622 TYPE 2 DIABETES MELLITUS WITH OTHER SKIN ULCER, WITHOUT LONG-TERM CURRENT USE OF INSULIN (HCC): ICD-10-CM

## 2019-10-16 LAB
AMBIGUOUS DTTM AMBI4: NORMAL
SIGNIFICANT IND 70042: NORMAL
SITE SITE: NORMAL
SOURCE SOURCE: NORMAL

## 2019-10-16 PROCEDURE — 87186 SC STD MICRODIL/AGAR DIL: CPT

## 2019-10-16 PROCEDURE — 87070 CULTURE OTHR SPECIMN AEROBIC: CPT

## 2019-10-16 PROCEDURE — 87077 CULTURE AEROBIC IDENTIFY: CPT | Mod: 91

## 2019-10-16 PROCEDURE — 90686 IIV4 VACC NO PRSV 0.5 ML IM: CPT | Performed by: PHYSICIAN ASSISTANT

## 2019-10-16 PROCEDURE — 87205 SMEAR GRAM STAIN: CPT

## 2019-10-16 PROCEDURE — 90471 IMMUNIZATION ADMIN: CPT | Performed by: PHYSICIAN ASSISTANT

## 2019-10-16 PROCEDURE — 87075 CULTR BACTERIA EXCEPT BLOOD: CPT

## 2019-10-16 PROCEDURE — 99214 OFFICE O/P EST MOD 30 MIN: CPT | Mod: 25 | Performed by: PHYSICIAN ASSISTANT

## 2019-10-16 RX ORDER — CYCLOBENZAPRINE HCL 10 MG
10 TABLET ORAL 2 TIMES DAILY PRN
Qty: 60 TAB | Refills: 3 | Status: SHIPPED | OUTPATIENT
Start: 2019-10-16

## 2019-10-16 RX ORDER — SULFAMETHOXAZOLE AND TRIMETHOPRIM 800; 160 MG/1; MG/1
1 TABLET ORAL 2 TIMES DAILY
Qty: 28 TAB | Refills: 0 | Status: SHIPPED | OUTPATIENT
Start: 2019-10-16 | End: 2019-10-30

## 2019-10-16 RX ORDER — GABAPENTIN 300 MG/1
300 CAPSULE ORAL 2 TIMES DAILY PRN
Qty: 60 CAP | Refills: 3 | Status: SHIPPED | OUTPATIENT
Start: 2019-10-16

## 2019-10-16 SDOH — HEALTH STABILITY: MENTAL HEALTH: HOW OFTEN DO YOU HAVE A DRINK CONTAINING ALCOHOL?: MONTHLY OR LESS

## 2019-10-16 SDOH — HEALTH STABILITY: MENTAL HEALTH: HOW MANY STANDARD DRINKS CONTAINING ALCOHOL DO YOU HAVE ON A TYPICAL DAY?: 1 OR 2

## 2019-10-16 ASSESSMENT — PATIENT HEALTH QUESTIONNAIRE - PHQ9: CLINICAL INTERPRETATION OF PHQ2 SCORE: 0

## 2019-10-17 PROBLEM — E66.9 OBESITY: Status: ACTIVE | Noted: 2017-11-10

## 2019-10-17 PROBLEM — E66.9 OBESITY (BMI 35.0-39.9 WITHOUT COMORBIDITY): Status: ACTIVE | Noted: 2019-10-17

## 2019-10-17 PROBLEM — Z00.00 PREVENTATIVE HEALTH CARE: Status: ACTIVE | Noted: 2017-11-10

## 2019-10-17 PROBLEM — I87.2 VENOUS STASIS DERMATITIS: Status: ACTIVE | Noted: 2017-11-10

## 2019-10-17 LAB
GRAM STN SPEC: NORMAL
SIGNIFICANT IND 70042: NORMAL
SITE SITE: NORMAL
SOURCE SOURCE: NORMAL

## 2019-10-17 NOTE — ASSESSMENT & PLAN NOTE
This is a recurring condition, new to this provider.  This patient currently has left lower leg swelling, redness, heat to touch and pain.  He has been treated for cellulitis multiple times in the past and believes that Bactrim tends to work well to help resolve this problem.  He does also have a history of sepsis.  He denies systemic symptoms including fevers today.

## 2019-10-17 NOTE — ASSESSMENT & PLAN NOTE
Last A1c: 8/22/19 6.5%    DM Medications:  Patient reports he does not take medication for diabetes.   HTN: Blood pressure goal <130/<80 No - very elevated  ACE: lisinopril 40mg   Hyperlipidemia: Cholesterol goal LDL <100 No recent labs on file.   Currently Rx Statin:none.   Diabetic diet: Soemtimes  Exercise: none.   Last monofilament foot exam: 10/16/19 with edema in left leg, reduced sensation bilaterally and onychomycosis in toenails. Checks feet at home: Yes, no sores currently   Last Eye exam: overdue.    Kidney function: no recent labs on file  Microalbumin screening: overdue   Has patient received flu vaccine: Yes  Has patient received Hep B series:No    A1c goal <7 Yes  Current barriers to control include none.   Glucose monitoring frequency: every there day  Fasting sugars: 's per patient no log today.   Hypoglycemic episodes No  Diabetic complications: peripheral neuropathy, cardiovascular disease and peripheral vascular disease    The patient is not taking ASA every day and is not taking all other medications as prescribed. Patient denies any side effects of medication.

## 2019-10-17 NOTE — ASSESSMENT & PLAN NOTE
This is a recurring condition, new to this provider today.  This patient has no less than 7 open, weeping cutaneous level ulcers on his left leg with surrounding edema, erythema and heat to touch.  He has been keeping this covered with Vaseline and gauze pads and wrapped in an Ace wrap.  He has been seen by wound care multiple times in the past for the same problem and at this point no longer feels he is able to treat this so has a follow-up appointment with wound care in the next week.

## 2019-10-17 NOTE — PROGRESS NOTES
Chief Complaint   Patient presents with   • Establish Care   • Leg Pain       HISTORY OF THE PRESENT ILLNESS: This is a 49 y.o. male new patient to our practice who presents today for evaluation and management of:    Ulcer of lower extremity due to diabetes  This is a recurring condition, new to this provider today.  This patient has no less than 7 open, weeping cutaneous level ulcers on his left leg with surrounding edema, erythema and heat to touch.  He has been keeping this covered with Vaseline and gauze pads and wrapped in an Ace wrap.  He has been seen by wound care multiple times in the past for the same problem and at this point no longer feels he is able to treat this so has a follow-up appointment with wound care in the next week.    Type 2 diabetes mellitus with skin complication  Last A1c: 8/22/19 6.5%    DM Medications:  Patient reports he does not take medication for diabetes.   HTN: Blood pressure goal <130/<80 No - very elevated  ACE: lisinopril 40mg   Hyperlipidemia: Cholesterol goal LDL <100 No recent labs on file.   Currently Rx Statin:none.   Diabetic diet: Soemtimes  Exercise: none.   Last monofilament foot exam: 10/16/19 with edema in left leg, reduced sensation bilaterally and onychomycosis in toenails. Checks feet at home: Yes, no sores currently   Last Eye exam: overdue.    Kidney function: no recent labs on file  Microalbumin screening: overdue   Has patient received flu vaccine: Yes  Has patient received Hep B series:No    A1c goal <7 Yes  Current barriers to control include none.   Glucose monitoring frequency: every there day  Fasting sugars: 's per patient no log today.   Hypoglycemic episodes No  Diabetic complications: peripheral neuropathy, cardiovascular disease and peripheral vascular disease    The patient is not taking ASA every day and is not taking all other medications as prescribed. Patient denies any side effects of medication.        Obesity (BMI 35.0-39.9 without  comorbidity) (HCC)  This is a chronic condition with recent weight loss of approximately 5 pounds in the last 2 weeks.  This patient is continuing to work on weight loss and diet control for improved diabetes and health.    Encounter for medical examination to establish care  This patient wishes to establish with a new primary care provider today.    Cellulitis of left leg  This is a recurring condition, new to this provider.  This patient currently has left lower leg swelling, redness, heat to touch and pain.  He has been treated for cellulitis multiple times in the past and believes that Bactrim tends to work well to help resolve this problem.  He does also have a history of sepsis.  He denies systemic symptoms including fevers today.      Past Medical History:   Diagnosis Date   • Cellulitis     both lower legs   • Diabetes    • Hypercholesteremia    • Hypertension        Past Surgical History:   Procedure Laterality Date   • OTHER ABDOMINAL SURGERY      exploratory; appendectomy   • OTHER ORTHOPEDIC SURGERY      both knees x 3       Family Status   Relation Name Status   • Fa  (Not Specified)   • PAunt  (Not Specified)   • PUnc  (Not Specified)     Family History   Problem Relation Age of Onset   • Heart Disease Father    • Hypertension Paternal Aunt    • Diabetes Paternal Uncle        Social History     Tobacco Use   • Smoking status: Former Smoker     Years: 5.00     Types: Cigarettes     Last attempt to quit: 2015     Years since quittin.5   • Smokeless tobacco: Never Used   • Tobacco comment: quit 1 year ago   Substance Use Topics   • Alcohol use: Yes     Alcohol/week: 1.2 oz     Types: 2 Cans of beer per week     Frequency: Monthly or less     Drinks per session: 1 or 2     Comment: Occasional    • Drug use: No       Allergies: Penicillins; Hydrocodone; and Zinc    Current Outpatient Medications Ordered in Epic   Medication Sig Dispense Refill   • sulfamethoxazole-trimethoprim (BACTRIM DS) 800-160  MG tablet Take 1 Tab by mouth 2 times a day for 14 days. 28 Tab 0   • cyclobenzaprine (FLEXERIL) 10 MG Tab Take 1 Tab by mouth 2 times a day as needed for Moderate Pain. 60 Tab 3   • gabapentin (NEURONTIN) 300 MG Cap Take 1 Cap by mouth 2 times a day as needed (leg pain). 60 Cap 3   • lisinopril (PRINIVIL, ZESTRIL) 40 MG tablet Take 1 Tab by mouth every day. 90 Tab 3   • ONE TOUCH ULTRA TEST strip USE 1 STRIP TO CHECK GLUCOSE ONCE DAILY  3   • ONETOUCH DELICA LANCETS 33G Misc USE 1 TO CHECK GLUCOSE ONCE DAILY  3   • Blood Glucose Monitoring Suppl (ONE TOUCH ULTRA 2) w/Device Kit USE AS DIRECTED  0   • tramadol (ULTRAM) 50 MG Tab TRAMADOL HCL 50 MG TABS       No current Epic-ordered facility-administered medications on file.      Review of Systems: See HPI above.   Constitutional: Negative for fever, chills, unplanned weight change and positive for chronic malaise/fatigue.   HENT: Negative for ear pain or tinnitus, nosebleeds, congestion, sore throat and neck pain.    Eyes: Negative for blurred or decreased vision.   Respiratory: Negative for cough, sputum production. positve for chronic shortness of breath.    Cardiovascular: Negative for chest pain, palpitations, orthopnea, syncope and positive  For leg swelling.   Gastrointestinal: Negative for heartburn, nausea, vomiting and abdominal pain.    Genitourinary: Negative for dysuria, urgency and frequency.   Musculoskeletal: Negative for myalgias. Positive for back pain and joint pain.   Skin: See above.   Neurological: Negative for dizziness, tremors, focal weakness and headaches. Positive for neuropathic pain in feet,   Endo/Heme/Allergies: Does not bruise/bleed easily.    Psychiatric/Behavioral: Negative for depression, suicidal ideas and memory loss. The patient is nervous/anxious and does not have insomnia.  Pt does not use recreational drugs or excessive alcohol.       Exam:  BP (!) 166/110   Pulse 90   Temp 36.9 °C (98.4 °F) (Temporal)   Resp 18   Ht 1.778  "m (5' 10\")   Wt 112.9 kg (249 lb)   SpO2 99%   Body mass index is 35.73 kg/m².  General:  Obese male in NAD  Eyes: Conjunctiva clear, lids without ptosis  ENMT: Nose and lips without deformity. Nasal mucosa and septum pink without evidence of purulent drainage.  Neck: Supple without masses upon palpation. Thyroid is not visibly enlarged.  Pulmonary: slightly increased effort inconversation. No rales, ronchi, or wheezing upon auscultation.   Cardiovascular: Regular rate and rhythm without murmur. Carotid and radial pulses are intact and equal bilaterally.   Extremities: No cyanosis. Bilateral upper extremities without edema. Severe edema in left lower leg with edema in right lower leg which is less severe.   Skin: Warm and dry. Seven open, subcutaneous-thick lesions oozing serous fluid noted at the left lower leg are tender to touch and surrounded by erythematous, edematous hot to touch skin.   Musculoskeletal: Normal gait.   Psych: somewhat abnormal mood and affect. Alert and oriented x3. Judgment and insight is normal.    Medical Decision Making & Discussion:   1. Need for vaccination    - Influenza Vaccine Quad Injection (PF)    2. Encounter for medical examination to establish care  I am happy to participate in the care of this49 year old Man.       3. Ulcer of lower extremity due to diabetes (HCC)  Has wound care scheduled. Strongly encouraged to follow up with this.   - ANAEROBIC/AEROBIC/GRAM STAIN    4. Type 2 diabetes mellitus with other skin ulcer, without long-term current use of insulin ([facility])  Well controlled with diet. contiue as is for now.   - Lipid Profile; Future  - HEMOGLOBIN A1C; Future  - MICROALB/CREAT RATIO RAND. UR  - Comp Metabolic Panel; Future  - CBC WITH DIFFERENTIAL; Future  - Diabetic Monofilament Lower Extremity Exam    5. Cellulitis of left leg  Discussed signs of sepsis and when to go to ER.   - sulfamethoxazole-trimethoprim (BACTRIM DS) 800-160 MG tablet; Take 1 Tab by mouth 2 times " a day for 14 days.  Dispense: 28 Tab; Refill: 0  - ANAEROBIC/AEROBIC/GRAM STAIN    6. Muscle spasm of both lower legs    - cyclobenzaprine (FLEXERIL) 10 MG Tab; Take 1 Tab by mouth 2 times a day as needed for Moderate Pain.  Dispense: 60 Tab; Refill: 3  - gabapentin (NEURONTIN) 300 MG Cap; Take 1 Cap by mouth 2 times a day as needed (leg pain).  Dispense: 60 Cap; Refill: 3    7. Obesity (BMI 35.0-39.9 without comorbidity)    - Patient identified as having weight management issue.  Appropriate orders and counseling given.        Please note that this dictation was created using voice recognition software. I have made every reasonable attempt to correct obvious errors, but I expect that there are errors of grammar and possibly content that I did not discover before finalizing the note.    Return in about 6 weeks (around 11/27/2019) for Chronic conditions or soner for non-resolution of cellulitis..

## 2019-10-17 NOTE — ASSESSMENT & PLAN NOTE
This is a chronic condition with recent weight loss of approximately 5 pounds in the last 2 weeks.  This patient is continuing to work on weight loss and diet control for improved diabetes and health.

## 2019-10-19 LAB
BACTERIA SPEC ANAEROBE CULT: NORMAL
BACTERIA WND AEROBE CULT: ABNORMAL
GRAM STN SPEC: ABNORMAL
SIGNIFICANT IND 70042: ABNORMAL
SIGNIFICANT IND 70042: NORMAL
SITE SITE: ABNORMAL
SITE SITE: NORMAL
SOURCE SOURCE: ABNORMAL
SOURCE SOURCE: NORMAL

## 2019-12-23 ENCOUNTER — HOSPITAL ENCOUNTER (OUTPATIENT)
Dept: HOSPITAL 8 - CVU | Age: 49
Discharge: HOME | End: 2019-12-23
Attending: SURGERY
Payer: MEDICAID

## 2019-12-23 DIAGNOSIS — M79.604: ICD-10-CM

## 2019-12-23 DIAGNOSIS — L97.901: Primary | ICD-10-CM

## 2019-12-23 DIAGNOSIS — M79.605: ICD-10-CM

## 2019-12-23 PROCEDURE — 93970 EXTREMITY STUDY: CPT

## 2020-02-10 ENCOUNTER — PATIENT MESSAGE (OUTPATIENT)
Dept: HEALTH INFORMATION MANAGEMENT | Facility: OTHER | Age: 50
End: 2020-02-10

## 2020-03-30 ENCOUNTER — TELEPHONE (OUTPATIENT)
Dept: HEALTH INFORMATION MANAGEMENT | Facility: OTHER | Age: 50
End: 2020-03-30

## 2020-03-30 NOTE — TELEPHONE ENCOUNTER
1. Caller Name: Leonel                        Call Back Number: [phone]  [facility] PCP or Specialty Provider: Yes Barbara Lang        2.  Does patient have any active symptoms of respiratory illness (fever OR cough OR shortness of breath OR sore throat)? No    3.  Does patient have any comoribidities? DM    4.  Has the patient traveled in the last 14 days OR had any known contact with someone who is suspected or confirmed to have COVID-19?  No.    5. Disposition: Cleared by RN Triage; OK to keep/schedule appointment    Note routed to [facility] Provider: MARCIA only.

## 2020-04-15 ENCOUNTER — APPOINTMENT (OUTPATIENT)
Dept: NEPHROLOGY | Facility: MEDICAL CENTER | Age: 50
End: 2020-04-15
Payer: MEDICAID

## 2021-03-23 ENCOUNTER — TELEPHONE (OUTPATIENT)
Dept: NEPHROLOGY | Facility: MEDICAL CENTER | Age: 51
End: 2021-03-23

## 2021-03-30 ENCOUNTER — APPOINTMENT (OUTPATIENT)
Dept: NEPHROLOGY | Facility: MEDICAL CENTER | Age: 51
End: 2021-03-30
Payer: MEDICAID

## 2021-03-30 DIAGNOSIS — N18.9 CHRONIC KIDNEY DISEASE, UNSPECIFIED CKD STAGE: ICD-10-CM

## 2022-08-03 ENCOUNTER — TELEPHONE (OUTPATIENT)
Dept: CARDIOLOGY | Facility: MEDICAL CENTER | Age: 52
End: 2022-08-03
Payer: MEDICAID

## 2022-10-24 ENCOUNTER — TELEPHONE (OUTPATIENT)
Dept: CARDIOLOGY | Facility: MEDICAL CENTER | Age: 52
End: 2022-10-24
Payer: MEDICAID

## 2022-10-24 NOTE — TELEPHONE ENCOUNTER
ALBARO    Called Patient to reschedule missed appointment on 10/21/2022. Patient wanted to reschedule but was not interested in seeing a Nurse Practitioner so I informed him that the doctor's schedule has not been released for the new year yet and I set it up to send him a recall letter to reschedule. He was alright with that and said he would call back in the next week or so.

## 2024-03-04 ENCOUNTER — APPOINTMENT (OUTPATIENT)
Dept: MEDICAL GROUP | Facility: MEDICAL CENTER | Age: 54
End: 2024-03-04

## 2024-03-27 ENCOUNTER — APPOINTMENT (OUTPATIENT)
Dept: MEDICAL GROUP | Facility: MEDICAL CENTER | Age: 54
End: 2024-03-27
Payer: MEDICARE